# Patient Record
Sex: FEMALE | Race: WHITE
[De-identification: names, ages, dates, MRNs, and addresses within clinical notes are randomized per-mention and may not be internally consistent; named-entity substitution may affect disease eponyms.]

---

## 2019-12-28 ENCOUNTER — HOSPITAL ENCOUNTER (INPATIENT)
Dept: HOSPITAL 97 - ER | Age: 59
LOS: 3 days | Discharge: HOME | DRG: 871 | End: 2019-12-31
Attending: INTERNAL MEDICINE | Admitting: INTERNAL MEDICINE
Payer: COMMERCIAL

## 2019-12-28 VITALS — BODY MASS INDEX: 36.6 KG/M2

## 2019-12-28 DIAGNOSIS — A41.9: Primary | ICD-10-CM

## 2019-12-28 DIAGNOSIS — Z90.5: ICD-10-CM

## 2019-12-28 DIAGNOSIS — R65.21: ICD-10-CM

## 2019-12-28 DIAGNOSIS — E11.22: ICD-10-CM

## 2019-12-28 DIAGNOSIS — I12.9: ICD-10-CM

## 2019-12-28 DIAGNOSIS — K21.9: ICD-10-CM

## 2019-12-28 DIAGNOSIS — I48.91: ICD-10-CM

## 2019-12-28 DIAGNOSIS — J18.9: ICD-10-CM

## 2019-12-28 DIAGNOSIS — N18.3: ICD-10-CM

## 2019-12-28 DIAGNOSIS — E11.65: ICD-10-CM

## 2019-12-28 DIAGNOSIS — N17.0: ICD-10-CM

## 2019-12-28 LAB
ALBUMIN SERPL BCP-MCNC: 3.3 G/DL (ref 3.4–5)
ALP SERPL-CCNC: 87 U/L (ref 45–117)
ALT SERPL W P-5'-P-CCNC: 18 U/L (ref 12–78)
AST SERPL W P-5'-P-CCNC: 14 U/L (ref 15–37)
BUN BLD-MCNC: 33 MG/DL (ref 7–18)
GLUCOSE SERPLBLD-MCNC: 171 MG/DL (ref 74–106)
HCT VFR BLD CALC: 35.1 % (ref 36–45)
INR BLD: 1.04
LIPASE SERPL-CCNC: 108 U/L (ref 73–393)
LYMPHOCYTES # SPEC AUTO: 0.5 K/UL (ref 0.7–4.9)
MAGNESIUM SERPL-MCNC: 1.7 MG/DL (ref 1.8–2.4)
NT-PROBNP SERPL-MCNC: 239 PG/ML (ref ?–125)
PMV BLD: 9 FL (ref 7.6–11.3)
POTASSIUM SERPL-SCNC: 3.9 MMOL/L (ref 3.5–5.1)
RBC # BLD: 3.7 M/UL (ref 3.86–4.86)
TROPONIN (EMERG DEPT USE ONLY): < 0.02 NG/ML (ref 0–0.04)

## 2019-12-28 PROCEDURE — 74176 CT ABD & PELVIS W/O CONTRAST: CPT

## 2019-12-28 PROCEDURE — 84439 ASSAY OF FREE THYROXINE: CPT

## 2019-12-28 PROCEDURE — 71250 CT THORAX DX C-: CPT

## 2019-12-28 PROCEDURE — 84443 ASSAY THYROID STIM HORMONE: CPT

## 2019-12-28 PROCEDURE — 82728 ASSAY OF FERRITIN: CPT

## 2019-12-28 PROCEDURE — 71045 X-RAY EXAM CHEST 1 VIEW: CPT

## 2019-12-28 PROCEDURE — 81003 URINALYSIS AUTO W/O SCOPE: CPT

## 2019-12-28 PROCEDURE — 80053 COMPREHEN METABOLIC PANEL: CPT

## 2019-12-28 PROCEDURE — 82570 ASSAY OF URINE CREATININE: CPT

## 2019-12-28 PROCEDURE — 85025 COMPLETE CBC W/AUTO DIFF WBC: CPT

## 2019-12-28 PROCEDURE — 80202 ASSAY OF VANCOMYCIN: CPT

## 2019-12-28 PROCEDURE — 93005 ELECTROCARDIOGRAM TRACING: CPT

## 2019-12-28 PROCEDURE — 83880 ASSAY OF NATRIURETIC PEPTIDE: CPT

## 2019-12-28 PROCEDURE — 76700 US EXAM ABDOM COMPLETE: CPT

## 2019-12-28 PROCEDURE — 80048 BASIC METABOLIC PNL TOTAL CA: CPT

## 2019-12-28 PROCEDURE — 71046 X-RAY EXAM CHEST 2 VIEWS: CPT

## 2019-12-28 PROCEDURE — 84300 ASSAY OF URINE SODIUM: CPT

## 2019-12-28 PROCEDURE — 83605 ASSAY OF LACTIC ACID: CPT

## 2019-12-28 PROCEDURE — 84484 ASSAY OF TROPONIN QUANT: CPT

## 2019-12-28 PROCEDURE — 93306 TTE W/DOPPLER COMPLETE: CPT

## 2019-12-28 PROCEDURE — 84466 ASSAY OF TRANSFERRIN: CPT

## 2019-12-28 PROCEDURE — 36415 COLL VENOUS BLD VENIPUNCTURE: CPT

## 2019-12-28 PROCEDURE — 83690 ASSAY OF LIPASE: CPT

## 2019-12-28 PROCEDURE — 99285 EMERGENCY DEPT VISIT HI MDM: CPT

## 2019-12-28 PROCEDURE — 94640 AIRWAY INHALATION TREATMENT: CPT

## 2019-12-28 PROCEDURE — 82607 VITAMIN B-12: CPT

## 2019-12-28 PROCEDURE — 87040 BLOOD CULTURE FOR BACTERIA: CPT

## 2019-12-28 PROCEDURE — 83540 ASSAY OF IRON: CPT

## 2019-12-28 PROCEDURE — 83735 ASSAY OF MAGNESIUM: CPT

## 2019-12-28 PROCEDURE — 85610 PROTHROMBIN TIME: CPT

## 2019-12-28 PROCEDURE — 80076 HEPATIC FUNCTION PANEL: CPT

## 2019-12-28 PROCEDURE — 83036 HEMOGLOBIN GLYCOSYLATED A1C: CPT

## 2019-12-28 RX ADMIN — IPRATROPIUM BROMIDE SCH MG: 0.5 SOLUTION RESPIRATORY (INHALATION) at 20:00

## 2019-12-28 RX ADMIN — TAZOBACTAM SODIUM AND PIPERACILLIN SODIUM SCH MLS: 250; 2 INJECTION, SOLUTION INTRAVENOUS at 17:00

## 2019-12-28 RX ADMIN — ALBUTEROL SULFATE SCH: 2.5 SOLUTION RESPIRATORY (INHALATION) at 16:00

## 2019-12-28 RX ADMIN — Medication SCH: at 21:00

## 2019-12-28 RX ADMIN — SODIUM CHLORIDE SCH: 0.9 INJECTION, SOLUTION INTRAVENOUS at 23:00

## 2019-12-28 RX ADMIN — IPRATROPIUM BROMIDE SCH MG: 0.5 SOLUTION RESPIRATORY (INHALATION) at 14:15

## 2019-12-28 RX ADMIN — ALBUTEROL SULFATE SCH MG: 2.5 SOLUTION RESPIRATORY (INHALATION) at 20:00

## 2019-12-28 RX ADMIN — SODIUM CHLORIDE SCH MLS: 0.9 INJECTION, SOLUTION INTRAVENOUS at 13:00

## 2019-12-28 NOTE — ER
Nurse's Notes                                                                                     

 [facility]                                                                 

Name: Jeanne Jett                                                                                

Age: 59 yrs                                                                                       

Sex: Female                                                                                       

: 1960                                                                                   

MRN: [id number]                                                                                   

Arrival Date: 2019                                                                          

Time: 09:12                                                                                       

Account#: [id number]                                                                            

Bed 7                                                                                             

Private MD:                                                                                       

Diagnosis: Pneumonia due to other specified bacteria;Weakness;Hypotension;Sepsis, unspecified     

  organism;Other chest pain;Unspecified kidney failure;Hypomagnesemia                             

                                                                                                  

Presentation:                                                                                     

                                                                                             

09:20 Presenting complaint: Patient states: woke up this morning around 0700 with srini         sv  

      shoulder pain, left sided chest tightness that radiates to the left arm, SOB,               

      dizziness. Denies n/v. Transition of care: patient was not received from another            

      setting of care. Onset of symptoms was 2019 at 07:00. Risk Assessment: Do      

      you want to hurt yourself or someone else? Patient reports no desire to harm self or        

      others. Care prior to arrival: None.                                                        

09:20 Method Of Arrival: Wheelchair                                                           sv  

09:20 Acuity: PATRICIA 2                                                                           sv  

09:32 Initial Sepsis Screen: Does the patient meet any 2 criteria? HR > 90 bpm. No. Patient's sv  

      initial sepsis screen is negative. Does the patient have a suspected source of              

      infection? No. Patient's initial sepsis screen is negative.                                 

11:39 Initial Sepsis Screen: Does the patient meet any 2 criteria? Systolic BP < 90 mmHg.     sv  

      Mean Arterial Pressure (MAP) < 65. Yes Does the patient have a suspected source of          

      infection? Yes: Productive cough/pneumonia If YES to both, name of provider notified:       

      Antonio Flores MD                                                                           

                                                                                                  

Triage Assessment:                                                                                

09:20 General: Appears in no apparent distress. uncomfortable, well developed, Behavior is    sv  

      calm, cooperative, appropriate for age. Pain: Complains of pain in anterior aspect of       

      left upper chest, left breast, anterior aspect of right shoulder, anterior aspect of        

      left shoulder, left bicep, left antecubital area, dorsal aspect of left forearm, left       

      wrist and left hand Pain currently is 7 out of 10 on a pain scale. Neuro: Level of          

      Consciousness is awake, alert, obeys commands, Oriented to person, place, time,             

      situation, Gait is steady, Speech is normal. Cardiovascular: Patient's skin is warm and     

      dry. Respiratory: Reports shortness of breath on exertion Airway is patent Respiratory      

      effort is even, unlabored, Respiratory pattern is regular, symmetrical. GI: Patient         

      currently denies nausea, vomiting. Derm: Skin is intact, Skin is pink, warm \T\ dry.        

                                                                                                  

Historical:                                                                                       

- Allergies:                                                                                      

09: Erythromycin;                                                                           sv  

- Home Meds:                                                                                      

:22 Percocet Oral [Active]; Lyrica Oral [Active]; Prilosec Oral [Active];                   sv  

- PMHx:                                                                                           

09:22 Back pain;                                                                              sv  

- PSHx:                                                                                           

:22 right kidney removed;                                                                   sv  

                                                                                                  

- Immunization history:: Flu vaccine is up to date.                                               

- Social history:: Smoking status: Patient/guardian denies using tobacco.                         

- Ebola Screening: : No symptoms or risks identified at this time.                                

- Family history:: not pertinent.                                                                 

                                                                                                  

                                                                                                  

Screenin:30 Abuse screen: Denies threats or abuse. Denies injuries from another. Nutritional        sv  

      screening: No deficits noted. Tuberculosis screening: No symptoms or risk factors           

      identified. Fall Risk None identified.                                                      

                                                                                                  

Assessment:                                                                                       

09:40 Reassessment: Patient appears in no apparent distress at this time. No changes from     sv  

      previously documented assessment. Patient and/or family updated on plan of care and         

      expected duration. Pain level reassessed. Patient is alert, oriented x 3, equal             

      unlabored respirations, skin warm/dry/pink.                                                 

10:30 Reassessment: Patient appears in no apparent distress at this time. Patient and/or      sv  

      family updated on plan of care and expected duration. Pain level reassessed. Patient is     

      alert, oriented x 3, equal unlabored respirations, skin warm/dry/pink.                      

11:04 Reassessment: Patient appears in no apparent distress at this time. Patient and/or      sv  

      family updated on plan of care and expected duration. Pain level reassessed. General:       

      Appears in no apparent distress. comfortable, Behavior is cooperative, drowsy. Neuro:.      

      Respiratory: Respiratory effort is even, unlabored, Respiratory pattern is regular,         

      symmetrical.                                                                                

11:40 Reassessment: Code Sepsis called.                                                       sv  

12:19 Reassessment: Repeat lactate sent and called Janett in lab to inform her it was sent.   sv  

12:39 Reassessment: Dr Chacko at bedside.                                                   sv  

13:28 Reassessment: Patient appears in no apparent distress at this time. No changes from     sv  

      previously documented assessment. Patient and/or family updated on plan of care and         

      expected duration. Pain level reassessed. Patient is alert, oriented x 3, equal             

      unlabored respirations, skin warm/dry/pink.                                                 

14:41 Reassessment: Patient appears in no apparent distress at this time. No changes from     sv  

      previously documented assessment. Patient and/or family updated on plan of care and         

      expected duration. Pain level reassessed. Patient is alert, oriented x 3, equal             

      unlabored respirations, skin warm/dry/pink.                                                 

18:00 Reassessment: Attempted to call report.                                                 sv  

                                                                                                  

Vital Signs:                                                                                      

09:27 Pulse 112 MON; Resp 18; Temp 98.0; Pulse Ox 99% on R/A; Pain 7/10;                      sg  

09:33  / 94; Pulse 105; Resp 20; Temp 98.2; Pulse Ox 96% ; Weight 90.72 kg; Height 5    sv  

      ft. 2 in. (157.48 cm);                                                                      

09:48 Pulse 83; Resp 16; Pulse Ox 96% ;                                                       sv  

10:30 BP 71 / 46; Pulse 87; Resp 17; Pulse Ox 96% ;                                           sv  

11:03 BP 79 / 68; Pulse 90; Resp 16; Temp 98.0(O); Pulse Ox 100% on Nebulizer Mask;           mh5 

11:35 BP 80 / 55; Pulse 92; Resp 20; Pulse Ox 100% on 2 lpm NC;                               sv  

11:46 BP 89 / 55; Pulse 90; Resp 21; Pulse Ox 100% on 2 lpm NC;                               sv  

12:43 BP 90 / 64; Pulse 91; Resp 14; Temp 98.8(TE); Pulse Ox 100% on R/A;                     mh5 

13:49 BP 87 / 62; Pulse 81; Resp 16; Pulse Ox 97% on 2 lpm NC;                                sv  

14:00 BP 85 / 53; Pulse 86; Resp 16; Pulse Ox 99% on R/A;                                     sv  

15:29 BP 98 / 61; Pulse 87; Resp 17; Temp 98.2; Pulse Ox 99% on R/A;                          sg  

15:50 BP 91 / 58; Pulse 82; Resp 17; Temp 98.2; Pulse Ox 100% on R/A;                         sg  

09:33 Body Mass Index 36.58 (90.72 kg, 157.48 cm)                                             sv  

10:30 Dr Flores informed of vitals, medication orders received, see MAR.                    sv  

11:35 MAP-62                                                                                  sv  

                                                                                                  

ED Course:                                                                                        

09:12 Patient arrived in ED.                                                                  ss  

09:21 Triage completed.                                                                       sv  

09:21 Antonio Flores MD is Attending Physician.                                             kali 

09:22 Arm band placed on.                                                                     sv  

09:22 Patient has correct armband on for positive identification. Placed in gown. Bed in low  sv  

      position. Call light in reach. Adult w/ patient. Cardiac monitor on. Pulse ox on. NIBP      

      on. Door closed. Head of bed elevated.                                                      

09:30 Lorna Shaver, RN is Primary Nurse.                                                  sv  

09:33 Patient maintains SpO2 saturation greater than 95% on room air.                         sv  

09:40 Awaiting ED provider evaluation.                                                        sv  

09:48 XRAY Chest (1 view) Sent.                                                               sv  

09:48 X-ray(s) taken.                                                                         sv  

09:49 XRAY Chest (1 view) In Process Unspecified.                                             EDMS

09:54 ED physician to see patient.                                                            sv  

10:15 Radiology exam delayed due to lab results not completed at this time. (BUN/Creatinine). bq  

10:20 Basic Metabolic Panel Sent.                                                             mh5 

10:20 CBC with Diff Sent.                                                                     mh5 

10:20 LFT's Sent.                                                                             mh5 

10:20 Magnesium Sent.                                                                         mh5 

10:20 NT PRO-BNP Sent.                                                                        mh5 

10:20 PT-INR Sent.                                                                            mh5 

10:20 Troponin (emerg Dept Use Only) Sent.                                                    mh5 

10:21 Initial lab(s) drawn, by me, sent to lab. EKG done, by ED staff, reviewed by Antonio Flores MD. Inserted saline lock: 20 gauge in right antecubital area, using aseptic        

      technique. Blood collected.                                                                 

10:25 Inserted saline lock: 20 gauge in right wrist, using aseptic technique. Flushed right   sv  

      with 5 ml normal saline.                                                                    

10:30 First set of blood cultures drawn by me. Inserted saline lock: 20 gauge in left wrist,  sv  

      using aseptic technique. Blood collected. Flushed left with 5 ml normal saline.             

10:45 Second set of blood cultures drawn by me.                                               sv  

10:59 Neha Chacko MD is Hospitalizing Provider.                                          kali 

11:17 Patient moved to CT via stretcher.                                                      sv  

11:23 CT completed. Patient tolerated procedure well. Patient moved back from CT.             nj  

11:23 CT Chest Abdomen Pelvis W/O Contrast: no iv no oral In Process Unspecified.             EDMS

11:42 Awaiting bed assignment, Awaiting radiology results.                                    sv  

11:51 Lactate Sent.                                                                           mh5 

11:51 Repeat lab(s) drawn.                                                                    5 

17:26 No provider procedures requiring assistance completed. Patient admitted, IV remains in  sv  

      place. intact.                                                                              

                                                                                                  

Administered Medications:                                                                         

      Discontinued: Heparin (MI Drip) 12 units/kg/hr - (HEParin 75880 units, D5W 500 ml) IV       

      at calculated rate Per protocol; Max initial rate 1000 units/hr                             

10:14 Drug: Aspirin Chewable Tablet 324 mg Route: PO;                                         sv  

11:02 Follow up: Response: No adverse reaction                                                sv  

10:14 Drug: Lopressor 25 mg Route: PO;                                                        sv  

11:01 Follow up: Response: No adverse reaction                                                sv  

10:14 Drug: ProTONIX 40 mg Route: IVP; Site: right antecubital;                               sv  

11:01 Follow up: Response: No adverse reaction                                                sv  

10:20 Drug: Zofran 4 mg Route: IVP; Site: right antecubital;                                  sv  

11:00 Follow up: Response: No adverse reaction                                                sv  

10:22 Drug: morphine 2 mg {Note: rass0.} Route: IVP; Site: right antecubital;                 sv  

11:00 Follow up: Response: No adverse reaction; Pain is decreased; RASS: Light sedation (-2)  sv  

10:23 Drug: Heparin (MI Drip) 12 units/kg/hr - (HEParin 03700 units, D5W 500 ml)              sv  

      {Co-Signature: sg (Víctor Boyer RN).} Route: IV; Rate: calculated rate; Site: right           

      antecubital;                                                                                

10:23 Drug: Heparin (MI-Bolus No thrombolytic) - HEParin 60 units/kg {Co-Signature: sg        sv  

      (Víctor Boyer RN).} Route: IVP; Site: right antecubital;                                      

11:01 Follow up: Response: No adverse reaction                                                sv  

10:55 Drug: Rocephin 2 grams Route: IV; Rate: per protocol; Site: left wrist;                 sv  

10:59 Follow up: Response: No adverse reaction; IV Status: Completed infusion; IV Intake: 20mlsv  

10:59 Drug: Xopenex 1.25 mg Route: Inhalation;                                                sv  

10:59 Drug: AtroVENT Aerosol 0.5 mg Route: Inhalation;                                        sv  

11:00 Drug: NS 0.9% 1000 ml Route: IV; Rate: 1 bolus; Site: left wrist;                       sv  

12:20 Follow up: Response: No adverse reaction; IV Status: Completed infusion; IV Intake:     sv  

      1000ml                                                                                      

11:00 Drug: LevaQUIN 750 mg Volume: 150 ml; Route: IVPB; Infused Over: 90 mins; Site: left    sv  

      wrist;                                                                                      

12:39 Follow up: Response: No adverse reaction; IV Status: Completed infusion; IV Intake:     sv  

      150ml                                                                                       

11:00 Drug: NS 0.9% 1000 ml Route: IV; Rate: 1 bolus; Site: left wrist;                       sv  

12:38 Follow up: Response: No adverse reaction; IV Status: Completed infusion; IV Intake:     sv  

      1000ml                                                                                      

11:12 Drug: Magnesium Sulfate 1 grams Route: IVPB; Infused Over: 1 hrs; Site: right wrist;    sv  

12:20 Follow up: Response: No adverse reaction; IV Status: Completed infusion; IV Intake:     sv  

      100ml                                                                                       

12:19 Drug: NS 0.9% 1000 ml Route: IV; Rate: 1 bolus; Site: left forearm;                     sv  

13:30 Follow up: Response: No adverse reaction; IV Status: Completed infusion; IV Intake:     sv  

      1000ml                                                                                      

13:28 Drug: NS 0.9% 2000 ml Route: IV; Rate: 1 bolus; Site: left wrist;                       sg  

15:00 Follow up: Response: No adverse reaction; IV Status: Completed infusion; IV Intake:     sv  

      2000ml                                                                                      

14:41 Drug: NS 0.9% 1000 ml Route: IV; Rate: 125 ml/hr; Site: left wrist;                     sv  

18:49 Follow up: Response: No adverse reaction; IV Status: Infusion continued upon admission  sv  

                                                                                                  

                                                                                                  

Intake:                                                                                           

10:59 IV: 20ml; Total: 20ml.                                                                  sv  

12:20 IV: 1000ml; Total: 1020ml.                                                              sv  

12:20 IV: 100ml; Total: 1120ml.                                                               sv  

12:38 IV: 1000ml; Total: 2120ml.                                                              sv  

12:39 IV: 150ml; Total: 2270ml.                                                               sv  

13:30 IV: 1000ml; Total: 3270ml.                                                              sv  

15:00 IV: 2000ml; Total: 5270ml.                                                              sv  

                                                                                                  

Outcome:                                                                                          

11:01 Decision to Hospitalize by Provider.                                                    kali 

17:26 Admitted to ER Hold.  Please see [facility] for further documentation.                    sv  

17:26 Condition: stable                                                                           

17:26 Instructed on the need for admit.                                                           

18:49 Patient left the ED.                                                                    sv  

                                                                                                  

Signatures:                                                                                       

Dispatcher [facility]                           Lorna Benton RN RN sv Gay, Steven, RN                         RN   sg                                                   

Antonio Flores MD MD cha Quilty, Betty                                                                                   

Sarah Millan RN RN                                                      

[place] [id number]                                                  

Víctor Boyer RN                                                                                   

                                                                                                  

Corrections: (The following items were deleted from the chart)                                    

09:33 09:20 Acuity: PATRICIA 3 sv                                                                  sv  

09:34 09:33 Resp 20bpm; Pulse Ox 96%; Temp 98.2F; 90.72 kg; Height 5 ft. 2 in.; BMI: 36.5; sv sv  

09:40 09:33 Pulse 105bpm; Resp 20bpm; Pulse Ox 96%; Temp 98.2F; 90.72 kg; Height 5 ft. 2 in.; sv  

      BMI: 36.5; sv                                                                               

11:11 11:03 BP 79 / 68; Pulse 90bpm; Resp 16bpm; Pulse Ox 100% Nebulizer Mask; [facility] 

                                                                                                  

**************************************************************************************************

## 2019-12-28 NOTE — RAD REPORT
EXAM DESCRIPTION:  CT - Chest Abd Pelvis Wo Con - 12/28/2019 11:23 am

 

CLINICAL HISTORY:  Cough;Abdominal distention, bilateral shoulder and upper chest pain, chest tightne
ss radiating to the left arm, shortness of breath

 

COMPARISON:  None.

 

TECHNIQUE:  During dynamic enhancement using 100 milliliters nonionic IV contrast, axial 5 millimeter
 thick images of the chest, abdomen and pelvis were obtained. Biphasic technique was utilized through
 the abdomen.  Oral contrast was administered.

 

All CT scans are performed using dose optimization technique as appropriate and may include automated
 exposure control or mA/KV adjustment according to patient size.

 

FINDINGS:  Moderate-sized area airspace opacification is present in the posterior inferior right uppe
r lobe near the minor fissure. Air bronchograms are present. No cavitation. Patchy interstitial and a
lveolar opacities are present in the posterior gutter on the right. Right middle lobe and left lung f
ield are spared. No pneumothorax or pleural effusion.  No chest wall mass or abnormal axillary lympha
denopathy seen.  Mediastinal and hilar regions show no mass or lymphadenopathy.  No significant cardi
ac finding.

 

The liver, spleen and pancreas show no significant findings.  Gallbladder and biliary tree are normal
.

 

Left kidney is absent. No hydronephrosis of the right kidney. No mass identified. Isodense masses and
 pyelonephritis are not excluded on a noncontrast examination. No adrenal abnormalities.  Urinary idris
dder is contracted. Uterus is normal size for age. There is substantial limitation of mid and pelvic 
floor assessment due to bilateral hip prosthesis spray artifact.

 

No dilated bowel loops or focal ball bowel wall thickening.  No free air, free fluid or inflammatory 
stranding.  No mass or bulky lymphadenopathy. A 2 centimeter fat only umbilical hernia is present. An
 adjacent 3 cm supraumbilical hernia seen on the right with a 2 centimeter neck. An additional 5 cent
imeter diameter fat only hernias present to the right of midline above the umbilicus. This has a 2 ce
ntimeter neck.

 

Postsurgical changes are present with fusion hardware in place L3-S1. Advanced degenerative changes a
re present involving T11- L2. Advanced degenerative disc disease is present. There is a retrolisthesi
s of L2 on L3. Acute component doubtful. T10 hemangiomas present.

 

IMPRESSION:  Moderate-sized right upper lobe pneumonia with smaller right lower lobe pneumonia. No ca
vitation or complicating finding.

 

No other significant chest finding.

 

No acute abdominal or pelvic finding. Left kidney is absent.

 

Umbilical and supraumbilical fat only ventral hernias seen.

 

Postsurgical fusion changes at L3-S1 with extensive advanced for age degenerative disc and bone dee
es T11-L2.

## 2019-12-28 NOTE — EDPHYS
Physician Documentation                                                                           

 [facility]                                                                 

Name: Jeanne Jett                                                                                

Age: 59 yrs                                                                                       

Sex: Female                                                                                       

: 1960                                                                                   

MRN: [id number]                                                                                   

Arrival Date: 2019                                                                          

Time: 09:12                                                                                       

Account#: [id number]                                                                            

Bed 7                                                                                             

Private MD:                                                                                       

ED Physician Antonio Flores                                                                      

HPI:                                                                                              

                                                                                             

09:58 This 59 yrs old  Female presents to ER via Wheelchair with complaints of Chest kali 

      Pain.                                                                                       

09:58 The patient or guardian reports chest pain that is located primarily in the substernal  kali 

      area, anterior chest wall. Onset: last night. The pain radiates to the left arm.            

      Associated signs and symptoms: The patient has no apparent associated signs or              

      symptoms. The chest pain is described as a heaviness, squeezing. Modifying factors: The     

      symptoms are alleviated by nothing. the symptoms are aggravated by nothing. The patient     

      has not experienced similar symptoms in the past.                                           

                                                                                                  

Historical:                                                                                       

- Allergies:                                                                                      

09:22 Erythromycin;                                                                           sv  

- Home Meds:                                                                                      

09:22 Percocet Oral [Active]; Lyrica Oral [Active]; Prilosec Oral [Active];                   sv  

- PMHx:                                                                                           

09:22 Back pain;                                                                              sv  

- PSHx:                                                                                           

09:22 right kidney removed;                                                                   sv  

                                                                                                  

- Immunization history:: Flu vaccine is up to date.                                               

- Social history:: Smoking status: Patient/guardian denies using tobacco.                         

- Ebola Screening: : No symptoms or risks identified at this time.                                

- Family history:: not pertinent.                                                                 

                                                                                                  

                                                                                                  

ROS:                                                                                              

09:58 Constitutional: Negative for fever, chills, and weight loss, Eyes: Negative for injury, kali 

      pain, redness, and discharge, ENT: Negative for injury, pain, and discharge, Neck:          

      Negative for injury, pain, and swelling, Respiratory: Negative for shortness of breath,     

      cough, wheezing, and pleuritic chest pain, Abdomen/GI: Negative for abdominal pain,         

      nausea, vomiting, diarrhea, and constipation, Back: Negative for injury and pain, :       

      Negative for injury, bleeding, discharge, and swelling, MS/Extremity: Negative for          

      injury and deformity, Neuro: Negative for headache, weakness, numbness, tingling, and       

      seizure, Psych: Negative for depression, anxiety, suicide ideation, homicidal ideation,     

      and hallucinations, Allergy/Immunology: Negative for hives, rash, and allergies,            

      Endocrine: Negative for neck swelling, polydipsia, polyuria, polyphagia, and marked         

      weight changes, Hematologic/Lymphatic: Negative for swollen nodes, abnormal bleeding,       

      and unusual bruising.                                                                       

09:58 Cardiovascular: Positive for chest pain, of the left arm and chest.                         

                                                                                                  

Exam:                                                                                             

09:58 Constitutional:  This is a well developed, well nourished patient who is awake, alert,  kali 

      and in no acute distress. Head/Face:  Normocephalic, atraumatic. Eyes:  Pupils equal        

      round and reactive to light, extra-ocular motions intact.  Lids and lashes normal.          

      Conjunctiva and sclera are non-icteric and not injected.  Cornea within normal limits.      

      Periorbital areas with no swelling, redness, or edema. ENT:  Nares patent. No nasal         

      discharge, no septal abnormalities noted.  Tympanic membranes are normal and external       

      auditory canals are clear.  Oropharynx with no redness, swelling, or masses, exudates,      

      or evidence of obstruction, uvula midline.  Mucous membranes moist. Neck:  Trachea          

      midline, no thyromegaly or masses palpated, and no cervical lymphadenopathy.  Supple,       

      full range of motion without nuchal rigidity, or vertebral point tenderness.  No            

      Meningismus. Chest/axilla:  Normal chest wall appearance and motion.  Nontender with no     

      deformity.  No lesions are appreciated. Cardiovascular:  Regular rate and rhythm with a     

      normal S1 and S2.  No gallops, murmurs, or rubs.  Normal PMI, no JVD.  No pulse             

      deficits. Respiratory:  Lungs have equal breath sounds bilaterally, clear to                

      auscultation and percussion.  No rales, rhonchi or wheezes noted.  No increased work of     

      breathing, no retractions or nasal flaring. Abdomen/GI:  Soft, non-tender, with normal      

      bowel sounds.  No distension or tympany.  No guarding or rebound.  No evidence of           

      tenderness throughout. Back:  No spinal tenderness.  No costovertebral tenderness.          

      Full range of motion. MS/ Extremity:  Pulses equal, no cyanosis.  Neurovascular intact.     

       Full, normal range of motion. Neuro:  Awake and alert, GCS 15, oriented to person,         

      place, time, and situation.  Cranial nerves II-XII grossly intact.  Motor strength 5/5      

      in all extremities.  Sensory grossly intact.  Cerebellar exam normal.  Normal gait.         

      Psych:  Awake, alert, with orientation to person, place and time.  Behavior, mood, and      

      affect are within normal limits.                                                            

09:58 Skin: Appearance: Color: pale, Temperature: normal temperature, Moisture: diaphoretic,      

      petechiae, not noted, ecchymosis, not noted, flushing, not noted, diaphoresis is noted,     

      swelling, is not appreciated.                                                               

10:01 Musculoskeletal/extremity: ROM: intact in all extremities, full active range of motion, kali 

      full passive range of motion, Circulation is intact in all extremities. Sensation           

      intact. Compartment Syndrome exam of affected extremity: is normal. DVT Exam: No signs      

      of deep vein thrombosis. no pain, no swelling, no tenderness, negative Homans' sign         

      noted on exam, no appreciated bluish discoloration, no erythema, no increased warmth.       

                                                                                                  

Vital Signs:                                                                                      

09:27 Pulse 112 MON; Resp 18; Temp 98.0; Pulse Ox 99% on R/A; Pain 7/10;                      sg  

09:33  / 94; Pulse 105; Resp 20; Temp 98.2; Pulse Ox 96% ; Weight 90.72 kg; Height 5    sv  

      ft. 2 in. (157.48 cm);                                                                      

09:48 Pulse 83; Resp 16; Pulse Ox 96% ;                                                       sv  

10:30 BP 71 / 46; Pulse 87; Resp 17; Pulse Ox 96% ;                                           sv  

11:03 BP 79 / 68; Pulse 90; Resp 16; Temp 98.0(O); Pulse Ox 100% on Nebulizer Mask;           mh5 

11:35 BP 80 / 55; Pulse 92; Resp 20; Pulse Ox 100% on 2 lpm NC;                               sv  

11:46 BP 89 / 55; Pulse 90; Resp 21; Pulse Ox 100% on 2 lpm NC;                               sv  

12:43 BP 90 / 64; Pulse 91; Resp 14; Temp 98.8(TE); Pulse Ox 100% on R/A;                     mh5 

13:49 BP 87 / 62; Pulse 81; Resp 16; Pulse Ox 97% on 2 lpm NC;                                sv  

14:00 BP 85 / 53; Pulse 86; Resp 16; Pulse Ox 99% on R/A;                                     sv  

15:29 BP 98 / 61; Pulse 87; Resp 17; Temp 98.2; Pulse Ox 99% on R/A;                          sg  

15:50 BP 91 / 58; Pulse 82; Resp 17; Temp 98.2; Pulse Ox 100% on R/A;                         sg  

09:33 Body Mass Index 36.58 (90.72 kg, 157.48 cm)                                             sv  

10:30 Dr Flores informed of vitals, medication orders received, see MAR.                    sv  

11:35 MAP-62                                                                                  sv  

                                                                                                  

MDM:                                                                                              

09:21 Patient medically screened.                                                             [facility] 

10:01 Data reviewed: vital signs, nurses notes, lab test result(s), EKG, radiologic studies,  [facility] 

      CT scan, plain films.                                                                       

                                                                                                  

                                                                                             

09:32 Order name: Basic Metabolic Panel; Complete Time: 11:03                                   

                                                                                             

09:32 Order name: CBC with Diff; Complete Time: 10:32                                           

                                                                                             

09:32 Order name: LFT's; Complete Time: 11:03                                                   

                                                                                             

09:32 Order name: Magnesium; Complete Time: 11:03                                               

                                                                                             

09:32 Order name: NT PRO-BNP; Complete Time: 11:03                                              

                                                                                             

09:32 Order name: PT-INR; Complete Time: 10:32                                                  

                                                                                             

09:32 Order name: Troponin (emerg Dept Use Only); Complete Time: 11:03                          

                                                                                             

09:34 Order name: Lipase; Complete Time: 11:03                                                [facility] 

                                                                                             

10:26 Order name: Blood Culture Adult (2)                                                     [facility] 

                                                                                             

11:39 Order name: Lactate                                                                     sv  

                                                                                             

12:25 Order name: CBC with Automated Diff                                                     [facility]

                                                                                             

12:25 Order name: CBC with Automated Diff                                                     [facility]

                                                                                             

12:25 Order name: Comprehensive Metabolic Panel                                               [facility]

                                                                                             

12:25 Order name: Comprehensive Metabolic Panel                                               [facility]

                                                                                             

09:32 Order name: XRAY Chest (1 view); Complete Time: 10:24                                     

                                                                                             

11:02 Order name: CT Chest Abdomen Pelvis W/O Contrast: no iv no oral                         [facility] 

                                                                                             

12:25 Order name: Troponin I                                                                  [facility]

                                                                                             

12:25 Order name: Troponin I                                                                  [facility]

                                                                                             

12:25 Order name: Troponin I                                                                  [facility]

                                                                                             

12:25 Order name: Troponin I                                                                  [facility]

                                                                                             

12:25 Order name: Vancomycin Level Trough                                                     [facility]

                                                                                             

12:25 Order name: Vancomycin Level Trough                                                     [facility]

                                                                                             

13:28 Order name: Urine Dipstick--Ancillary (enter results)                                   eb  

                                                                                             

13:48 Order name: Urine Creatinine                                                            sg  

                                                                                             

13:48 Order name: Urine Sodium Random                                                         sg  

                                                                                             

14:12 Order name: Urine Dipstick-Ancillary                                                    [facility]

                                                                                             

14:15 Order name: UR CREAT                                                                    [facility]

                                                                                             

14:17 Order name: UR SODIUM                                                                   [facility]

                                                                                             

09:32 Order name: EKG; Complete Time: 09:33                                                     

                                                                                             

09:32 Order name: Cardiac monitoring; Complete Time: 09:39                                      

                                                                                             

09:32 Order name: EKG - Nurse/Tech; Complete Time: 09:39                                        

                                                                                             

09:32 Order name: IV Saline Lock; Complete Time: 09:48                                          

                                                                                             

09:32 Order name: Labs collected and sent; Complete Time: 09:48                                 

                                                                                             

09:32 Order name: O2 Per Protocol; Complete Time: 09:40                                         

                                                                                             

09:32 Order name: O2 Sat Monitoring; Complete Time: 09:40                                       

                                                                                             

09:53 Order name: EKG; Complete Time: 09:54                                                     

                                                                                             

09:53 Order name: EKG - Nurse/Tech; Complete Time: 10:14                                        

                                                                                             

09:58 Order name: EKG - Nurse/Tech; Complete Time: 10:19                                      [facility] 

                                                                                             

09:58 Order name: EKG; Complete Time: 09:58                                                   [facility] 

                                                                                             

10:07 Order name: Labs - recollect needed: all labs to be recollected; Complete Time: 10:19   eb  

                                                                                             

12:25 Order name: CONS Pharmacy Consult                                                       [facility]

                                                                                             

12:25 Order name: Clear Liquid                                                                [facility]

                                                                                             

15:28 Order name: Diet Clear Liquid; Complete Time: 15:29                                       

                                                                                             

10:45 Order name: IV Saline Lock - Large Bore; Complete Time: 10:59                           [facility] 

                                                                                             

12:09 Order name: Labs - recollect needed: recollect lac; Complete Time: 12:19                eb  

                                                                                                  

Administered Medications:                                                                         

      Discontinued: Heparin (MI Drip) 12 units/kg/hr - (HEParin 60709 units, D5W 500 ml) IV       

      at calculated rate Per protocol; Max initial rate 1000 units/hr                             

10:14 Drug: Aspirin Chewable Tablet 324 mg Route: PO;                                         sv  

11:02 Follow up: Response: No adverse reaction                                                sv  

10:14 Drug: Lopressor 25 mg Route: PO;                                                        sv  

11:01 Follow up: Response: No adverse reaction                                                sv  

10:14 Drug: ProTONIX 40 mg Route: IVP; Site: right antecubital;                               sv  

11:01 Follow up: Response: No adverse reaction                                                sv  

10:20 Drug: Zofran 4 mg Route: IVP; Site: right antecubital;                                  sv  

11:00 Follow up: Response: No adverse reaction                                                sv  

10:22 Drug: morphine 2 mg {Note: rass0.} Route: IVP; Site: right antecubital;                 sv  

11:00 Follow up: Response: No adverse reaction; Pain is decreased; RASS: Light sedation (-2)  sv  

10:23 Drug: Heparin (MI Drip) 12 units/kg/hr - (HEParin 36287 units, D5W 500 ml)              sv  

      {Co-Signature: sg (Víctor Boyer RN).} Route: IV; Rate: calculated rate; Site: right           

      antecubital;                                                                                

10:23 Drug: Heparin (MI-Bolus No thrombolytic) - HEParin 60 units/kg {Co-Signature: sg        sv  

      (Víctor Boyer RN).} Route: IVP; Site: right antecubital;                                      

11:01 Follow up: Response: No adverse reaction                                                sv  

10:55 Drug: Rocephin 2 grams Route: IV; Rate: per protocol; Site: left wrist;                 sv  

10:59 Follow up: Response: No adverse reaction; IV Status: Completed infusion; IV Intake: 20mlsv  

10:59 Drug: Xopenex 1.25 mg Route: Inhalation;                                                sv  

10:59 Drug: AtroVENT Aerosol 0.5 mg Route: Inhalation;                                        sv  

11:00 Drug: NS 0.9% 1000 ml Route: IV; Rate: 1 bolus; Site: left wrist;                       sv  

12:20 Follow up: Response: No adverse reaction; IV Status: Completed infusion; IV Intake:     sv  

      1000ml                                                                                      

11:00 Drug: LevaQUIN 750 mg Volume: 150 ml; Route: IVPB; Infused Over: 90 mins; Site: left    sv  

      wrist;                                                                                      

12:39 Follow up: Response: No adverse reaction; IV Status: Completed infusion; IV Intake:     sv  

      150ml                                                                                       

11:00 Drug: NS 0.9% 1000 ml Route: IV; Rate: 1 bolus; Site: left wrist;                       sv  

12:38 Follow up: Response: No adverse reaction; IV Status: Completed infusion; IV Intake:     sv  

      1000ml                                                                                      

11:12 Drug: Magnesium Sulfate 1 grams Route: IVPB; Infused Over: 1 hrs; Site: right wrist;    sv  

12:20 Follow up: Response: No adverse reaction; IV Status: Completed infusion; IV Intake:     sv  

      100ml                                                                                       

12:19 Drug: NS 0.9% 1000 ml Route: IV; Rate: 1 bolus; Site: left forearm;                     sv  

13:30 Follow up: Response: No adverse reaction; IV Status: Completed infusion; IV Intake:     sv  

      1000ml                                                                                      

13:28 Drug: NS 0.9% 2000 ml Route: IV; Rate: 1 bolus; Site: left wrist;                       sg  

15:00 Follow up: Response: No adverse reaction; IV Status: Completed infusion; IV Intake:     sv  

      2000ml                                                                                      

14:41 Drug: NS 0.9% 1000 ml Route: IV; Rate: 125 ml/hr; Site: left wrist;                     sv  

18:49 Follow up: Response: No adverse reaction; IV Status: Infusion continued upon admission  sv  

                                                                                                  

                                                                                                  

Disposition:                                                                                      

19 11:01 Hospitalization ordered by Neha Chacko for Inpatient Admission. Preliminary    

  diagnosis are Pneumonia due to other specified bacteria, Weakness,                              

  Hypotension, Sepsis, unspecified organism, Other chest pain, Unspecified                        

  kidney failure, Hypomagnesemia.                                                                 

- Bed requested for Telemetry/MedSurg (Inpatient).                                                

- Status is Inpatient Admission.                                                              sv  

- Condition is Fair.                                                                              

- Problem is new.                                                                                 

- Symptoms have improved.                                                                         

UTI on Admission? No                                                                              

                                                                                                  

                                                                                                  

                                                                                                  

Signatures:                                                                                       

Dispatcher [employer]                           Lorna Benton RN RN sv Gay, Steven, RN RN sg Anderson, Corey, MD MD cha Smirch, Shelby, RN RN ss Botello, Elizabeth eb Steven Gay RN                                                                                   

                                                                                                  

Corrections: (The following items were deleted from the chart)                                    

11:04 11:01 Hospitalization Ordered by Neha Chacko MD for Inpatient Admission. Preliminary [facility] 

      diagnosis is Pneumonia due to other specified bacteria; Weakness; Hypotension; Sepsis,      

      unspecified organism; Other chest pain; Unspecified kidney failure. Bed requested for       

      Telemetry/MedSurg (Inpatient). Status is Inpatient Admission. Condition is Fair.            

      Problem is new. Symptoms have improved. UTI on Admission? No. kali                           

11:09 09:58 Angio Aorta For Dissection+CT.RAD.BRZ ordered. EDMS                               EDMS

11:10 10:58 Thorax Wo Con+CT.RAD.BRZ ordered. EDMS                                            EDMS

11:15 11:04 2019 11:01 Hospitalization Ordered by Neha Chacko MD for Inpatient       eb  

      Admission. Preliminary diagnosis is Pneumonia due to other specified bacteria;              

      Weakness; Hypotension; Sepsis, unspecified organism; Other chest pain; Unspecified          

      kidney failure; Hypomagnesemia. Bed requested for Telemetry/MedSurg (Inpatient). Status     

      is Inpatient Admission. Condition is Fair. Problem is new. Symptoms have improved. UTI      

      on Admission? No. kali                                                                       

14:13 11:15 2019 11:01 Hospitalization Ordered by Neha Chacko MD for Inpatient       eb  

      Admission. Preliminary diagnosis is Pneumonia due to other specified bacteria;              

      Weakness; Hypotension; Sepsis, unspecified organism; Other chest pain; Unspecified          

      kidney failure; Hypomagnesemia. Bed requested for Telemetry/MedSurg (Inpatient). Status     

      is Inpatient Admission. Condition is Fair. Problem is new. Symptoms have improved. UTI      

      on Admission? No. eb                                                                        

17:52 14:13 2019 11:01 Hospitalization Ordered by Neha Chacko MD for Inpatient       eb  

      Admission. Preliminary diagnosis is Pneumonia due to other specified bacteria;              

      Weakness; Hypotension; Sepsis, unspecified organism; Other chest pain; Unspecified          

      kidney failure; Hypomagnesemia. Bed requested for [facility] ER HOLD. Status is Inpatient         

      Admission. Condition is Fair. Problem is new. Symptoms have improved. UTI on Admission?     

      No. eb                                                                                      

18:49 17:52 2019 11:01 Hospitalization Ordered by Neha Chacko MD for Inpatient       sv  

      Admission. Preliminary diagnosis is Pneumonia due to other specified bacteria;              

      Weakness; Hypotension; Sepsis, unspecified organism; Other chest pain; Unspecified          

      kidney failure; Hypomagnesemia. Bed requested for Telemetry/MedSurg (Inpatient). Status     

      is Inpatient Admission. Condition is Fair. Problem is new. Symptoms have improved. UTI      

      on Admission? No. eb                                                                        

                                                                                                  

**************************************************************************************************

## 2019-12-28 NOTE — RAD REPORT
EXAM DESCRIPTION:  RAD - Chest Single View - 12/28/2019 9:50 am

 

CLINICAL HISTORY:  Bilateral shoulder pain, left-sided chest pain, shortness of breath

 

COMPARISON:  None

 

TECHNIQUE:  AP portable chest image was obtained 0948 hours .

 

FINDINGS:  Lung volumes are low. Patchy airspace opacification is present in the mid right lung field
 most likely right upper lobe pneumonia. Bilateral lower lung field atelectasis changes are present. 
Failure and volume overload are not suspected.

 

 Heart and vasculature are normal. No measurable pleural effusion and no pneumothorax. No acute bony 
abnormality seen. No acute aortic findings suspected.

 

IMPRESSION:  Right upper lobe pneumonia.

## 2019-12-29 LAB
ALBUMIN SERPL BCP-MCNC: 2.5 G/DL (ref 3.4–5)
ALP SERPL-CCNC: 64 U/L (ref 45–117)
ALT SERPL W P-5'-P-CCNC: 13 U/L (ref 12–78)
AST SERPL W P-5'-P-CCNC: 9 U/L (ref 15–37)
BUN BLD-MCNC: 24 MG/DL (ref 7–18)
GLUCOSE SERPLBLD-MCNC: 85 MG/DL (ref 74–106)
HCT VFR BLD CALC: 29 % (ref 36–45)
LYMPHOCYTES # SPEC AUTO: 2.7 K/UL (ref 0.7–4.9)
PMV BLD: 9.6 FL (ref 7.6–11.3)
POTASSIUM SERPL-SCNC: 4.1 MMOL/L (ref 3.5–5.1)
RBC # BLD: 3.04 M/UL (ref 3.86–4.86)
TROPONIN I: < 0.02 NG/ML (ref 0–0.04)

## 2019-12-29 RX ADMIN — TAZOBACTAM SODIUM AND PIPERACILLIN SODIUM SCH MLS: 250; 2 INJECTION, SOLUTION INTRAVENOUS at 01:29

## 2019-12-29 RX ADMIN — ALBUTEROL SULFATE SCH MG: 2.5 SOLUTION RESPIRATORY (INHALATION) at 07:40

## 2019-12-29 RX ADMIN — SODIUM CHLORIDE SCH: 0.45 INJECTION, SOLUTION INTRAVENOUS at 22:00

## 2019-12-29 RX ADMIN — TAZOBACTAM SODIUM AND PIPERACILLIN SODIUM SCH MLS: 250; 2 INJECTION, SOLUTION INTRAVENOUS at 12:33

## 2019-12-29 RX ADMIN — SODIUM CHLORIDE SCH MLS: 0.45 INJECTION, SOLUTION INTRAVENOUS at 22:00

## 2019-12-29 RX ADMIN — SODIUM CHLORIDE SCH MLS: 0.9 INJECTION, SOLUTION INTRAVENOUS at 08:36

## 2019-12-29 RX ADMIN — TAZOBACTAM SODIUM AND PIPERACILLIN SODIUM SCH MLS: 250; 2 INJECTION, SOLUTION INTRAVENOUS at 17:38

## 2019-12-29 RX ADMIN — IPRATROPIUM BROMIDE SCH: 0.5 SOLUTION RESPIRATORY (INHALATION) at 14:00

## 2019-12-29 RX ADMIN — Medication SCH ML: at 12:37

## 2019-12-29 RX ADMIN — IPRATROPIUM BROMIDE SCH MG: 0.5 SOLUTION RESPIRATORY (INHALATION) at 01:20

## 2019-12-29 RX ADMIN — ALBUTEROL SULFATE SCH: 2.5 SOLUTION RESPIRATORY (INHALATION) at 00:00

## 2019-12-29 RX ADMIN — SODIUM CHLORIDE SCH: 0.9 INJECTION, SOLUTION INTRAVENOUS at 19:00

## 2019-12-29 RX ADMIN — IPRATROPIUM BROMIDE SCH MG: 0.5 SOLUTION RESPIRATORY (INHALATION) at 07:40

## 2019-12-29 RX ADMIN — FAMOTIDINE SCH MG: 20 TABLET, FILM COATED ORAL at 12:37

## 2019-12-29 RX ADMIN — ALBUTEROL SULFATE SCH MG: 2.5 SOLUTION RESPIRATORY (INHALATION) at 12:50

## 2019-12-29 RX ADMIN — ALBUTEROL SULFATE SCH MG: 2.5 SOLUTION RESPIRATORY (INHALATION) at 01:20

## 2019-12-29 NOTE — P.PN
Subjective


Date of Service: 12/29/19


Chief Complaint: feel better 


Subjective: No new changes, Improving, Doing well





Physical Examination





- Vital Signs


Temperature: 97.5 F


Blood Pressure: 93/50


Pulse: 77


Respirations: 20


Pulse Ox (%): 100





- Physical Exam


General: Alert, Oriented x3


HEENT: Atraumatic, Normocephalic


Neck: Supple, JVD not distended


Respiratory: Normal air movement, Diminished (right base )


Cardiovascular: Normal pulses, Regular rate/rhythm, Normal S1 S2


Gastrointestinal: Normal bowel sounds, Soft and benign


Musculoskeletal: No clubbing, No swelling


Integumentary: No rashes, No breakdown


Neurological: Normal gait, Normal speech, Normal strength at 5/5 x4 extr





- Studies





 Laboratory Last Values











WBC  15.8 K/uL (4.3-10.9)  H D 12/29/19  05:26    


 


RBC  3.04 M/uL (3.86-4.86)  L  12/29/19  05:26    


 


Hgb  9.7 g/dL (12.0-15.0)  L  12/29/19  05:26    


 


Hct  29.0 % (36.0-45.0)  L D 12/29/19  05:26    


 


MCV  95.4 fL ()   12/29/19  05:26    


 


MCH  31.9 pg (27.0-35.0)   12/29/19  05:26    


 


MCHC  33.5 g/dL (32.0-36.0)   12/29/19  05:26    


 


RDW  13.8 % (12.1-15.2)   12/29/19  05:26    


 


Plt Count  164 K/uL (152-406)  D 12/29/19  05:26    


 


MPV  9.6 fL (7.6-11.3)   12/29/19  05:26    


 


Neutrophils %  75.6 % (41.7-73.7)  H  12/29/19  05:26    


 


Lymphocytes %  16.9 % (15.3-44.8)   12/29/19  05:26    


 


Monocytes %  6.9 % (3.3-12.3)   12/29/19  05:26    


 


Eosinophils %  0.4 % (0-4.4)   12/29/19  05:26    


 


Basophils %  0.2 % (0-1.3)   12/29/19  05:26    


 


Absolute Neutrophils  11.9 K/uL (1.8-8.0)  H  12/29/19  05:26    


 


Absolute Lymphocytes  2.7 K/uL (0.7-4.9)   12/29/19  05:26    


 


Absolute Monocytes  1.1 K/uL (0.1-1.3)   12/29/19  05:26    


 


Absolute Eosinophils  0.1 K/uL (0-0.5)   12/29/19  05:26    


 


Absolute Basophils  0.0 K/uL (0-0.5)   12/29/19  05:26    


 


PT  12.2 SECONDS (9.5-12.5)   12/28/19  10:15    


 


INR  1.04   12/28/19  10:15    


 


Sodium  146 mmol/L (136-145)  H  12/29/19  05:26    


 


Potassium  4.1 mmol/L (3.5-5.1)   12/29/19  05:26    


 


Chloride  119 mmol/L ()  H  12/29/19  05:26    


 


Carbon Dioxide  22 mmol/L (21-32)   12/29/19  05:26    


 


BUN  24 mg/dL (7-18)  H  12/29/19  05:26    


 


Creatinine  1.18 mg/dL (0.55-1.3)   12/29/19  05:26    


 


Estimated GFR  47 mL/min (=/>90)  L  12/29/19  05:26    


 


Glucose  85 mg/dL ()   12/29/19  05:26    


 


Lactic Acid  1.9 mmol/L (0.4-2.0)   12/28/19  12:15    


 


Calcium  8.0 mg/dL (8.5-10.1)  L  12/29/19  05:26    


 


Magnesium  1.7 mg/dL (1.8-2.4)  L  12/28/19  10:15    


 


Total Bilirubin  0.3 mg/dL (0.2-1.0)   12/29/19  05:26    


 


Direct Bilirubin  0.2 mg/dL (0-0.2)   12/28/19  10:15    


 


AST  9 U/L (15-37)  L  12/29/19  05:26    


 


ALT  13 U/L (12-78)   12/29/19  05:26    


 


Alkaline Phosphatase  64 U/L ()   12/29/19  05:26    


 


Rapid Troponin I  < 0.02 ng/mL (0.0-0.045)   12/28/19  10:15    


 


Troponin I  < 0.02 ng/mL (0.0-0.045)   12/29/19  05:26    


 


NT-Pro-B Natriuret Pep  239 pg/mL (<125)  H  12/28/19  10:15    


 


Serum Total Protein  5.0 g/dL (6.4-8.2)  L  12/29/19  05:26    


 


Albumin  2.5 g/dL (3.4-5.0)  L  12/29/19  05:26    


 


Globulin  2.5 g/dL (2.3-3.5)   12/29/19  05:26    


 


Albumin/Globulin Ratio  1.0  (1.1-1.8)  L  12/29/19  05:26    


 


Lipase  108 U/L ()   12/28/19  10:15    


 


Urine pH  5.0  (5.0-7.0)   12/28/19  13:28    


 


Ur Specific Gravity  1.020  (1.005-1.030)   12/28/19  13:28    


 


Urine Ketones  Negative  (NEG)   12/28/19  13:28    


 


Urine Blood  Trace  (NEG)  H  12/28/19  13:28    


 


Urine Nitrite  Negative  (NEG)   12/28/19  13:28    


 


Ur Leukocyte Esterase  Negative  (NEG)   12/28/19  13:28    


 


Ur Random Sodium  115 mmol/L ()   12/28/19  21:58    


 


Urine Creatinine  138.0 mg/dL ()   12/28/19  13:50    


 


Urine Glucose  Negative  (NEG)   12/28/19  13:28    


 


Urine Total Protein  Negative  (NEG)   12/28/19  13:28    


 


Vancomycin Trough  14.0 ug/mL (5.0-20.0)   12/29/19  05:26    














Assessment & Plan





- Problems (Diagnosis)


(1) ARF (acute renal failure) with tubular necrosis


Current Visit: Yes   Status: Acute   





(2) Hyperglycemia


Current Visit: Yes   Status: Acute   





(3) Right middle lobe pneumonia


Current Visit: Yes   Status: Acute   





(4) Sepsis associated hypotension


Current Visit: Yes   Status: Acute   





(5) Single kidney


Current Visit: Yes   Status: Acute   


Physician Review: Patient Assessed, Agree with Above Assessment and Plan


Physician Review Additional Text: 





# Right middle and lower lobe pneumonia -improving 


-resolcing sepsis 


-c/w triople abx regime 





# ARF on baseline CKD -from single lkidney and pre-renal - improving 


-c/w IVF





# HTN-continue to hold meds for now 





# Hyperglycemia - start po intake , follow glucose trend

## 2019-12-29 NOTE — EKG
Test Date:    2019-12-28               Test Time:    09:23:41

Technician:   SWG                                    

                                                     

MEASUREMENT RESULTS:                                       

Intervals:                                           

Rate:         113                                    

UT:           118                                    

QRSD:         90                                     

QT:           324                                    

QTc:          444                                    

Axis:                                                

P:            52                                     

UT:           118                                    

QRS:          -23                                    

T:            38                                     

                                                     

INTERPRETIVE STATEMENTS:                                       

                                                     

Sinus tachycardia

Otherwise normal ECG

No previous ECG available for comparison



Electronically Signed On 12-29-19 11:42:15 CST by Dieudonne Montanez

## 2019-12-29 NOTE — HP
Date of Admission:  12/28/2019



Presenting Complaint:  Weakness and chest pain.



History Of Present Illness:  Jeanne Jett is a 59-year-old  female with past medical h
istory of GERD, on Protonix b.i.d.; recently travel to this area from [facility]; history of right
 kidney nephrectomy for reflux; nephropathy 15 years ago, who presented to the hospital because of ri
ght-sided radiating to substernal chest pain associated with weakness since yesterday.  Patient is al
so complaining of shortness of breath, initially on exertion, but later at rest.  She described her c
hest pain as pleuritic.  Patient initially denies any cough, but sister at bedside states the patient
 has been having dry hacking cough since the last 3 days.  Patient denies any sputum production.  She
 denies any recent contact.  She denies any recent travel since the last 1 month.  She states she has
 history of recurrent pneumonia in the past.  Last episode was about 2 years ago.  She denies any reg
ular antibiotics use.  In the ER, she was noted with marked hypoxia with requiring 2 L nasal cannula 
as well as hypotension with systolic down to the 70s/40s.  She has received 3 L of IV fluid bolus and
 her blood pressure is still ranging from 80-90 systolic.  She states chest pain somewhat feels liz
r.  CT of the chest shows evidence of right middle and upper lobe pneumonia.



Past Medical History:  Significant for,

1.GERD.

2.Reflux nephropathy, status post right nephrectomy.

3.History of chronic back pain.



Past Surgical History:  Right nephrectomy.



Family History:  Significant for diabetes in both parents.



Home Medications:  Percocet p.r.n., Lyrica as needed, as well as Prilosec 40 b.i.d.



Allergies:  ERYTHROMYCIN.



Social History:  Patient denies any history of tobacco, alcohol, or illicit drug use.  She normally l
[place] in [place] on vacation in the area since the last 2 months.



Review of Systems:

All systems reviewed x14 were negative except as mentioned above.



Physical Examination:

Current Vitals Sign:  Blood pressure of 86/53, pulse is down to 83 from initial admission of 01/15, O
2 saturation is 96% on 2 L nasal cannula.  Temperature is afebrile at 98.2. 

General:  Obese, elderly looking female, on nasal cannula O2. 

Head:  Atraumatic, normocephalic.  Pupils equal, reactive to light.  Slightly moist oral mucosa. 

Neck:  No JVD.  No carotid bruit. 

Respiratory:  Good air entry except for mild increase egophony over the right base.  No wheezing.  No
 rhonchi. 

Cardiovascular:  S1, S2.  Non-tachycardic. 

GI:  Abdomen full, soft, nontender.  Bowel sounds positive.  No epigastric tenderness.  No CVA tender
ness.  No suprapubic fullness. 

Extremities:  No pedal edema.  No calf tenderness. 

Neurologic:  Patient is alert, conversant.  No neurological focal motor deficit.



Laboratory Data:  EKG on initial presentation showed sinus tachy at 113 beats per minute.  No ST-segm
ent changes.  Repeat EKG, now heart rate down to 99 beats per minute.  Relatively unchanged. 



Chest x-ray shows right upper lobe pneumonia.  CT of abdomen and pelvis as well as chest without cont
rast consistent with a moderate-sized right upper lobe pneumonia with small right lower lobe pneumoni
a.  No crepitation __________, only ventral hernia seen, postsurgical fusion changes at L3-S1 with de
generative changes also noted. 



Rest of labs:  WBC 8.8, neutrophils 84%, no bands.  Platelet 208, hemoglobin 11.8.  INR 1.04.  Potass
ium 3.9, creatinine 1.79, glucose 171.  ProBNP of 239, lipase 108.  AST and ALT normal.



Impression:  

1.Right multilobar pneumonia.

2.Septic shock with hypotension.

3.Hypotension.

4.Gastroesophageal reflux disease.

5.Acute renal failure.

6.History of single left kidney.



Plan:  

1.Pneumonia with sepsis.  We will admit patient to the ICU since the persistent borderline low blood
 pressure.  We will continue IV fluid boluses.  We gave another 2 L bolus now.

2.History of persistent hypotension.  We will start patient on Levophed drip.  Follow blood culture.
  We brought in antibiotics to include vancomycin, Zosyn, as well as Levaquin for coverage at this ti
me.  We will do Tylenol p.r.n. as needed.  We will obtain sputum culture if feasible for Gram stain. 
 We will continue to wean O2 as needed.

3.Hypotension.  We do IV fluids and Levophed as needed.

4.Acute renal failure, likely due to ATN from hypotension, possibility also due to ATN from sepsis. 
 Unclear if patient has baseline CKD despite having single kidney as patient repeatedly state her pre
vious lab work usually normal.  We will obtain random urine sodium and creatinine to calculate fracti
onal excretion of sodium.  We will obtain renal sonogram to further evaluate single left kidney.  We 
will follow daily BMP.

5.Gastroesophageal reflux disease.  Continue PPI.

6.Advanced directive discussed with patient.  Patient wished to be full code.

7.DVT prophylaxis with subcutaneous Lovenox.  Low risk of PE at this time.  Given evidence of multil
obar pneumonia, we discontinue current heparin drip. 



Total time spent in review of record, discussion with patient, and evaluation greater than 60 minutes
.





EO/MODL

DD:  12/28/2019 13:10:42Voice ID:  [id number]

## 2019-12-30 LAB
ALBUMIN SERPL BCP-MCNC: 2.5 G/DL (ref 3.4–5)
ALP SERPL-CCNC: 75 U/L (ref 45–117)
ALT SERPL W P-5'-P-CCNC: 14 U/L (ref 12–78)
AST SERPL W P-5'-P-CCNC: 11 U/L (ref 15–37)
BUN BLD-MCNC: 17 MG/DL (ref 7–18)
FERRITIN SERPL-MCNC: 127.4 NG/ML (ref 8–388)
GLUCOSE SERPLBLD-MCNC: 85 MG/DL (ref 74–106)
HCT VFR BLD CALC: 29.9 % (ref 36–45)
IRON SERPL-MCNC: 40 UG/DL (ref 50–170)
LYMPHOCYTES # SPEC AUTO: 2.9 K/UL (ref 0.7–4.9)
PMV BLD: 10 FL (ref 7.6–11.3)
POTASSIUM SERPL-SCNC: 4 MMOL/L (ref 3.5–5.1)
RBC # BLD: 3.13 M/UL (ref 3.86–4.86)
TRANSFERRIN SERPL-MCNC: 134 MG/DL (ref 200–360)
TROPONIN I: < 0.02 NG/ML (ref 0–0.04)
TSH SERPL DL<=0.05 MIU/L-ACNC: 3.04 UIU/ML (ref 0.36–3.74)

## 2019-12-30 RX ADMIN — Medication SCH ML: at 00:32

## 2019-12-30 RX ADMIN — PANTOPRAZOLE SODIUM SCH MG: 40 TABLET, DELAYED RELEASE ORAL at 08:46

## 2019-12-30 RX ADMIN — Medication SCH ML: at 21:10

## 2019-12-30 RX ADMIN — Medication SCH ML: at 08:47

## 2019-12-30 RX ADMIN — GUAIFENESIN SCH MG: 600 TABLET, EXTENDED RELEASE ORAL at 08:46

## 2019-12-30 RX ADMIN — METOPROLOL TARTRATE PRN MG: 5 INJECTION INTRAVENOUS at 03:15

## 2019-12-30 RX ADMIN — PANTOPRAZOLE SODIUM SCH MG: 40 TABLET, DELAYED RELEASE ORAL at 16:17

## 2019-12-30 RX ADMIN — FAMOTIDINE SCH MG: 20 TABLET, FILM COATED ORAL at 08:46

## 2019-12-30 RX ADMIN — TAZOBACTAM SODIUM AND PIPERACILLIN SODIUM SCH MLS: 250; 2 INJECTION, SOLUTION INTRAVENOUS at 00:31

## 2019-12-30 RX ADMIN — TAZOBACTAM SODIUM AND PIPERACILLIN SODIUM SCH MLS: 250; 2 INJECTION, SOLUTION INTRAVENOUS at 17:16

## 2019-12-30 RX ADMIN — TAZOBACTAM SODIUM AND PIPERACILLIN SODIUM SCH MLS: 250; 2 INJECTION, SOLUTION INTRAVENOUS at 08:44

## 2019-12-30 RX ADMIN — METOPROLOL TARTRATE PRN MG: 5 INJECTION INTRAVENOUS at 11:19

## 2019-12-30 RX ADMIN — ENOXAPARIN SODIUM SCH MG: 100 INJECTION SUBCUTANEOUS at 16:18

## 2019-12-30 RX ADMIN — GUAIFENESIN SCH MG: 600 TABLET, EXTENDED RELEASE ORAL at 21:10

## 2019-12-30 RX ADMIN — ENOXAPARIN SODIUM SCH MG: 100 INJECTION SUBCUTANEOUS at 05:18

## 2019-12-30 NOTE — P.PN
Subjective


Date of Service: 12/30/19


Primary Care Provider: unknown


Chief Complaint: feel better 


Subjective: Other (Patient went into AFib last night.  Patient stable this 

time.  Shortness of breath has improved.)





Physical Examination





- Vital Signs


Temperature: 97.4 F


Blood Pressure: 128/83


Pulse: 137


Respirations: 20


Pulse Ox (%): 98





- Physical Exam


General: Alert, In no apparent distress, Oriented x3, Cooperative


HEENT: Atraumatic


Neck: Supple


Respiratory: Crackles/rales (To the right base)


Cardiovascular: Irregular heart rate/rhythm (AFib with rate around 120)


Gastrointestinal: Normal bowel sounds, No masses, No rebound, No guarding


Musculoskeletal: No erythema, No tenderness, No warmth


Integumentary: No significant lesion, No tenderness/swelling, No erythema, No 

warmth, No cyanosis


Neurological: Normal speech, Normal strength at 5/5 x4 extr, Normal tone, 

Normal affect





- Studies


Medications List Reviewed: Yes





Assessment & Plan


Discharge Plan: Home


Plan to discharge in: 24 Hours


Physician Review Additional Text: 





Impression:


New onset atrial fibrillation


Right upper and lower lobe pneumonia


Acute on chronic renal failure stage 3 with history of solitary kidney


Hypertension


Hypoglycemia





Plan:


New onset atrial fibrillation:  Patient started on IV metoprolol last night.  

Cardiology consulted.  Await further recommendation.  Patient also started on 

Lovenox at 1 milligram/kilogram subcu twice daily.  Patient will likely require 

chronic anti coagulation therapy at discharge. Medications including 

antibiotics had been adjusted.  Patient no longer on Levaquin.  Will order 

echocardiogram.  Wean off oxygen.  Will monitor closely.  Anticipate discharge 

in the next 24-48 hr with clinical improvement.


Right upper and lower lobe pneumonia:  Continue wean off oxygen.  Antibiotics 

adjusted.  Patient now on Zosyn.  Recheck chest x-ray.  Will monitor closely.


Acute on chronic renal failure stage 3 with history of solitary kidney:  IV 

fluids adjusted.  Patient with history of solitary kidney.  Will consult 

nephrology for further recommendation.


Hypertension:  Continue medication.  Will monitor closely.


Hypoglycemia:  Encourage oral intake.


Time Spent Managing Pts Care (In Minutes): 55

## 2019-12-30 NOTE — P.PN
Subjective


Date of Service: 12/30/19


Chief Complaint: feel better 


Subjective: Other (nursing reports that patient is now in A fib. No hx of this 

in past. Clinically stable.)





Physical Examination





- Vital Signs


Temperature: 98.2 F


Blood Pressure: 125/66


Pulse: 86


Respirations: 17


Pulse Ox (%): 92





Assessment & Plan


Discharge Plan: Home


Plan to discharge in: 48 Hours


Physician Review Additional Text: 





New onset Atrial fib: Will start Metoprolol IV PRN. Will increase Lovenox to 1 

mg/kg sc BID. Will order ECHO and consult Cardiology in the am. Will also DC 

Levaquin. 





Right middle and lower lobe pneumonia -improving 


-resolving sepsis 


-DC Levaquin and Vanco. Continue with Zosyn.  





ARF on baseline CKD -from single lkidney and pre-renal - improving 


-IV fluids adjusted. Will monitor renal function.





HTN-DC Hydralazine since she will be on Metoprolol. 





Hyperglycemia - monitor closely. 


Time Spent Managing Pts Care (In Minutes): 25

## 2019-12-30 NOTE — EKG
Test Date:    2019-12-30               Test Time:    10:52:55

Technician:   DINA                                     

                                                     

MEASUREMENT RESULTS:                                       

Intervals:                                           

Rate:         141                                    

GA:                                                  

QRSD:         88                                     

QT:           282                                    

QTc:          431                                    

Axis:                                                

P:                                                   

GA:                                                  

QRS:          13                                     

T:            66                                     

                                                     

INTERPRETIVE STATEMENTS:                                       

                                                     

Atrial fibrillation with rapid ventricular response

Abnormal ECG

Compared to ECG 12/30/2019 02:45:31

T-wave abnormality no longer present



Electronically Signed On 12-30-19 11:36:54 CST by Chris Swann

## 2019-12-30 NOTE — ECHO
HEIGHT: 5 ft 2 in   WEIGHT: 200 lb 0.054 oz   DATE OF STUDY: 12/30/2019   REFER DR: 
Miguel De La O DO

2-DIMENSIONAL: YES

         M.MODE: YES

     DOPPLER: YES

    COLOR FLOW: YES

    

                        TDS:  NO

    PORTABLE: NO

     DEFINITY:  NO

    BUBBLE STUDY: NO

    

    

DIAGNOSIS:  NEW ONSET ATRIAL FIBRILLATION



CARDIAC HISTORY:  

CATHERIZATION: NO

    SURGERY: NO

    PROSTHETIC VALVE: NO

    PACEMAKER: NO

    

    

MEASUREMENTS (cm)

    DIASTOLIC (NORMALS)                 SYSTOLIC (NORMALS)

IVSd                 1.1 (0.6-1.2)                    LA Diam 3.9 (1.9-4.0)     LVEF       
  65%  

LVIDd               4.9 (3.5-5.7)                        LVIDs      3.2 (2.0-3.5)     %FS  
        36%

LVPWd             1.2 (0.6-1.2)

Ao Diam           3.0 (2.0-3.7)



2 DIMENSIONAL ASSESSMENT:

RIGHT ATRIUM:                   NORMAL

    LEFT ATRIUM:       NORMAL

    

    RIGHT VENTRICLE:            NORMAL

    LEFT VENTRICLE: NORMAL

    

    TRICUSPID VALVE:             NORMAL

    MITRAL VALVE:     NORMAL

    

    PULMONIC VALVE:             NORMAL

    AORTIC VALVE:     NORMAL

    

    PERICARDIAL EFFUSION: NONE

    AORTIC ROOT:      NORMAL

    

    

LEFT VENTRICULAR WALL MOTION:     NORMAL



DOPPLER/COLOR FLOW:     MILD MITRAL AND TRICUSPID REGURGITATION. NORMAL RIGHT VENTRICULAR 
SYSTOLIC PRESSURE. 



COMMENTS:      NORMAL 2D ECHOCARDIOGRAM. MILD MITRAL AND TRICUSPID REGURGITATION. ATRIAL 
FIBRILLATION WITH RAPID VENTRICULAR RESPONSE. HEART RATE 120-140 BEATS PER MINUTE.



TECHNOLOGIST:   HERBERTH SEVILLA

## 2019-12-30 NOTE — EKG
Test Date:    2019-12-28               Test Time:    10:05:36

Technician:   SANDRITA                                    

                                                     

MEASUREMENT RESULTS:                                       

Intervals:                                           

Rate:         93                                     

MO:           130                                    

QRSD:         90                                     

QT:           348                                    

QTc:          432                                    

Axis:                                                

P:            44                                     

MO:           130                                    

QRS:          13                                     

T:            30                                     

                                                     

INTERPRETIVE STATEMENTS:                                       

                                                     

Normal sinus rhythm

Low voltage QRS

Borderline ECG

Compared to ECG 12/28/2019 09:23:41

Low QRS voltage now present

Sinus tachycardia no longer present



Electronically Signed On 12-30-19 10:21:08 CST by Chris Swann

## 2019-12-30 NOTE — EKG
Test Date:    2019-12-30               Test Time:    02:45:31

Technician:   RT HOPKINS                                   

                                                     

MEASUREMENT RESULTS:                                       

Intervals:                                           

Rate:         146                                    

SD:                                                  

QRSD:         92                                     

QT:           302                                    

QTc:          470                                    

Axis:                                                

P:                                                   

SD:                                                  

QRS:          7                                      

T:            91                                     

                                                     

INTERPRETIVE STATEMENTS:                                       

                                                     

Atrial fibrillation with rapid ventricular response

Low voltage QRS

Abnormal QRS-T angle, consider primary T wave abnormality

Abnormal ECG

Compared to ECG 12/28/2019 10:05:36

T-wave abnormality now present

Sinus rhythm no longer present



Electronically Signed On 12-30-19 11:36:57 CST by Chris Swann

## 2019-12-30 NOTE — CON
History Of Present Illness:  Ms. Jett is 59.  She came to the hospital with shortness of breath an
d generalized feeling of not feeling well.  At one point, she had enough weakness.  She could not jocy
lly stand up.  She was found to be hypotensive and has been in the hospital for several days and then
 last night, had an episode of atrial fibrillation.  I believe she is in sinus rhythm now.  She is no
t hooked up telemetry presently.  She does not recall having atrial fibrillation in the past.  She wa
s treated for hypertension at one point, but that seemed to go away.  She has depression and anxiety.




Medications:  She takes estradiol, pregabalin, omeprazole, tizanidine, oxycodone, acetaminophen, prog
esterone, and Wellbutrin.



Social History:  She uses no tobacco, alcohol, or illegal drugs.



Physical Examination:

Vital Signs:  4 feet 2 inches, 200 pounds. 

General:  Alert, oriented, pleasant, cooperative, obese, not in distress. 

Lungs:  Clear. 

Cardiac:  Regular rate and rhythm. 

Abdomen:  Soft. 

Extremities:  Normal.  No cyanosis, clubbing, or edema.



Assessment And Plan:  I believe the patient had transient atrial fibrillation.  We will get a repeat 
EKG.  We should do an echocardiogram and I will recommend that she tries to get on an antiarrhythmic 
drug.  If her echocardiogram shows ejection fraction is normal, we will try flecainide 50 b.i.d. and 
consider long-term 

anticoagulation.  She has a CHADS-VASc risk score of 0, so we can treat her with aspirin and flecaini
de as the only medications.





MARGUERITE

DD:  12/30/2019 10:34:16Voice ID:  [id number]

DT:  12/30/2019 15:00:27Report ID:  [id number]

## 2019-12-30 NOTE — RAD REPORT
EXAM DESCRIPTION:  RAD - Chest Pa And Lat (2 Views) - 12/30/2019 8:24 am

 

CLINICAL HISTORY:  Follow up pneumonia, pneumonia, shortness of breath

 

COMPARISON:  December 28 CT and portable chest

 

TECHNIQUE:  PA and lateral views of the chest were obtained.

 

FINDINGS:  The lungs are underinflated. Right upper lobe pneumonia changes are still present. Little 
interval change has occurred. Small or right base pneumonia changes also appear stable. No developing
 failure or volume overload seen.   Heart size is normal and central vasculature is within normal buenrostro
its.  No pleural effusion or pneumothorax seen.  Prominent degenerative changes are present. Neurosti
mulator wires are in place. There is accentuated kyphosis at the thoracolumbar junction. No aortic ab
normality.

 

IMPRESSION:  Right upper lobe and right lung base pneumonia findings not substantially different from
 comparison.

## 2019-12-31 VITALS — SYSTOLIC BLOOD PRESSURE: 138 MMHG | DIASTOLIC BLOOD PRESSURE: 88 MMHG

## 2019-12-31 VITALS — TEMPERATURE: 98.4 F

## 2019-12-31 VITALS — OXYGEN SATURATION: 97 %

## 2019-12-31 LAB
BUN BLD-MCNC: 12 MG/DL (ref 7–18)
GLUCOSE SERPLBLD-MCNC: 101 MG/DL (ref 74–106)
HCT VFR BLD CALC: 30.7 % (ref 36–45)
LYMPHOCYTES # SPEC AUTO: 2.9 K/UL (ref 0.7–4.9)
MAGNESIUM SERPL-MCNC: 1.7 MG/DL (ref 1.8–2.4)
PMV BLD: 9.9 FL (ref 7.6–11.3)
POTASSIUM SERPL-SCNC: 3.9 MMOL/L (ref 3.5–5.1)
RBC # BLD: 3.26 M/UL (ref 3.86–4.86)

## 2019-12-31 RX ADMIN — ENOXAPARIN SODIUM SCH MG: 100 INJECTION SUBCUTANEOUS at 17:06

## 2019-12-31 RX ADMIN — PANTOPRAZOLE SODIUM SCH MG: 40 TABLET, DELAYED RELEASE ORAL at 09:41

## 2019-12-31 RX ADMIN — GUAIFENESIN SCH MG: 600 TABLET, EXTENDED RELEASE ORAL at 09:40

## 2019-12-31 RX ADMIN — ENOXAPARIN SODIUM SCH MG: 100 INJECTION SUBCUTANEOUS at 04:56

## 2019-12-31 RX ADMIN — TAZOBACTAM SODIUM AND PIPERACILLIN SODIUM SCH MLS: 250; 2 INJECTION, SOLUTION INTRAVENOUS at 09:44

## 2019-12-31 RX ADMIN — TAZOBACTAM SODIUM AND PIPERACILLIN SODIUM SCH MLS: 250; 2 INJECTION, SOLUTION INTRAVENOUS at 00:42

## 2019-12-31 RX ADMIN — Medication SCH ML: at 09:39

## 2019-12-31 RX ADMIN — PANTOPRAZOLE SODIUM SCH MG: 40 TABLET, DELAYED RELEASE ORAL at 17:05

## 2019-12-31 RX ADMIN — FAMOTIDINE SCH MG: 20 TABLET, FILM COATED ORAL at 09:40

## 2019-12-31 RX ADMIN — METOPROLOL SUCCINATE SCH MG: 50 TABLET, EXTENDED RELEASE ORAL at 17:04

## 2019-12-31 RX ADMIN — METOPROLOL TARTRATE PRN MG: 5 INJECTION INTRAVENOUS at 02:15

## 2019-12-31 RX ADMIN — TAZOBACTAM SODIUM AND PIPERACILLIN SODIUM SCH: 250; 2 INJECTION, SOLUTION INTRAVENOUS at 17:00

## 2019-12-31 RX ADMIN — METOPROLOL SUCCINATE SCH MG: 50 TABLET, EXTENDED RELEASE ORAL at 06:21

## 2019-12-31 NOTE — P.CNS
Date of Consult: 12/31/19


Primary Care Provider: unknown


Chief Complaint: Pneumonia and AFib


History of Present Illness: 





Patient is 59 years of age.  She comes here frequently lives in [place] route of 

sudden onset of body aches felt very heavy:  Moves them easier to the emergency 

room, diagnosed with pneumonia and atrial fibrillation no prior history of AFib 

denies any fever or chills Rich has been admitted elevated patient has a 

history of reflux no prior history of cardiopulmonary disorders still is short 

of breath tachycardic


Allergies





erythromycin base Allergy (Verified 12/28/19 12:28)


 Hives





Home Medications: 








Omeprazole [Prilosec] 40 mg PO BID 12/28/19 


Oxycodone HCl/Acetaminophen [Percocet 5/325 Tab*] 7.5 mg PO Q6HR PRN 12/28/19 


Pregabalin 200 mg PO TID 12/28/19 


Progesterone, Micronized [Progesterone] 1 cap PO SEECOM 12/28/19 


Tizanidine HCl [Zanaflex] 4 mg PO Q8HR 12/28/19 


Wellbutrin Unknown Dosage  12/28/19 


estradioL [Estradiol] 0.5 mg PO DAILY 12/28/19 








- Past Medical/Surgical History


Diabetic: No


-: back pain


-: right kidney removed





- Social History


Place of Residence: Home





Review of Systems


10-point ROS is otherwise unremarkable


General: Weakness


Respiratory: Shortness of Breath





Physical Examination














Temp Pulse Resp BP Pulse Ox


 


 98.0 F   100 H  20   140/100 H  95 


 


 12/31/19 09:49  12/31/19 09:49  12/31/19 09:49  12/31/19 09:49  12/31/19 09:49








General: Alert, Mild distress


HEENT: Atraumatic


Neck: Supple


Respiratory: Diminished


Cardiovascular: No edema, Irregular heart rate/rhythm


Gastrointestinal: Normal bowel sounds, Soft and benign





- Problems


(1) Pneumonia


Current Visit: Yes   Status: Acute   


Plan: 


Patient is 59 years of age in need of a sudden onset of body aches and found to 

have pneumonia in the right upper lobe patient's white count is declining labs 

reviewed cultures negative sputum is pending can change to p.o. antibiotics 

avoid fluoroquinolones and macrolides consider combination of cefuroxime 500 mg 

twice a day.  Doxycycline for atypical coverage


Qualifiers: 


   Pneumonia type: due to unspecified organism   Laterality: right 





(2) Atrial fibrillation


Current Visit: Yes   Status: Acute   


Plan: 


Patient has new onset of AFib scheduled for cardioversion tomorrow seen by 

Cardiology did control on flecainide and Zosyn echocardiogram is normal


Qualifiers: 


   Atrial fibrillation type: paroxysmal   Qualified Code(s): I48.0 - Paroxysmal 

atrial fibrillation

## 2019-12-31 NOTE — P.PN
Subjective


Date of Service: 12/31/19


Primary Care Provider: unknown


Chief Complaint: feel better 


Subjective: No new changes, No C/O voiced (- c/o of wheezing and SOB today)





Review of Systems


Unremarkable





Physical Examination





- Vital Signs


Temperature: 98.0 F


Blood Pressure: 140/100


Pulse: 100


Respirations: 20


Pulse Ox (%): 95





- Physical Exam


General: Alert, Oriented x3


HEENT: Atraumatic, Normocephalic, PERRLA


Neck: Supple, 2+ carotid pulse no bruit


Respiratory: Normal air movement, Diminished, Expiratory wheezes


Cardiovascular: Normal pulses, Regular rate/rhythm, Normal S1 S2


Gastrointestinal: Normal bowel sounds, Soft and benign


Musculoskeletal: No clubbing, No swelling


Integumentary: No rashes, No breakdown


Neurological: Normal speech, Normal strength at 5/5 x4 extr, Normal tone





- Studies





 Laboratory Last Values











WBC  10.5 K/uL (4.3-10.9)  D 12/31/19  05:16    


 


RBC  3.26 M/uL (3.86-4.86)  L  12/31/19  05:16    


 


Hgb  10.4 g/dL (12.0-15.0)  L  12/31/19  05:16    


 


Hct  30.7 % (36.0-45.0)  L  12/31/19  05:16    


 


MCV  94.2 fL ()   12/31/19  05:16    


 


MCH  32.0 pg (27.0-35.0)   12/31/19  05:16    


 


MCHC  33.9 g/dL (32.0-36.0)   12/31/19  05:16    


 


RDW  13.7 % (12.1-15.2)   12/31/19  05:16    


 


Plt Count  174 K/uL (152-406)   12/31/19  05:16    


 


MPV  9.9 fL (7.6-11.3)   12/31/19  05:16    


 


Neutrophils %  64.8 % (41.7-73.7)   12/31/19  05:16    


 


Lymphocytes %  28.0 % (15.3-44.8)   12/31/19  05:16    


 


Monocytes %  5.4 % (3.3-12.3)   12/31/19  05:16    


 


Eosinophils %  1.3 % (0-4.4)   12/31/19  05:16    


 


Basophils %  0.5 % (0-1.3)   12/31/19  05:16    


 


Absolute Neutrophils  6.8 K/uL (1.8-8.0)   12/31/19  05:16    


 


Absolute Lymphocytes  2.9 K/uL (0.7-4.9)   12/31/19  05:16    


 


Absolute Monocytes  0.6 K/uL (0.1-1.3)   12/31/19  05:16    


 


Absolute Eosinophils  0.1 K/uL (0-0.5)   12/31/19  05:16    


 


Absolute Basophils  0.0 K/uL (0-0.5)   12/31/19  05:16    


 


PT  12.2 SECONDS (9.5-12.5)   12/28/19  10:15    


 


INR  1.04   12/28/19  10:15    


 


Sodium  147 mmol/L (136-145)  H  12/31/19  05:16    


 


Potassium  3.9 mmol/L (3.5-5.1)   12/31/19  05:16    


 


Chloride  119 mmol/L ()  H  12/31/19  05:16    


 


Carbon Dioxide  22 mmol/L (21-32)   12/31/19  05:16    


 


BUN  12 mg/dL (7-18)   12/31/19  05:16    


 


Creatinine  1.16 mg/dL (0.55-1.3)   12/31/19  05:16    


 


Estimated GFR  48 mL/min (=/>90)  L  12/31/19  05:16    


 


Glucose  101 mg/dL ()   12/31/19  05:16    


 


Hemoglobin A1c  4.8 % (4.2-6.3)   12/28/19  16:52    


 


Lactic Acid  1.0 mmol/L (0.4-2.0)   12/31/19  01:45    


 


Calcium  8.5 mg/dL (8.5-10.1)   12/31/19  05:16    


 


Magnesium  1.7 mg/dL (1.8-2.4)  L  12/31/19  05:16    


 


Iron  40.0 ug/dL ()  L  12/30/19  05:12    


 


TIBC  188 ug/dL (250-460)  L  12/30/19  05:12    


 


Transferrin  134 mg/dL (200-360)  L  12/30/19  05:12    


 


Transferrin % Sat  21.3 % (20.0-50.0)   12/30/19  05:12    


 


Ferritin  127.4 ng/mL (8-388)   12/30/19  05:12    


 


Total Bilirubin  0.3 mg/dL (0.2-1.0)   12/30/19  05:12    


 


Direct Bilirubin  0.2 mg/dL (0-0.2)   12/28/19  10:15    


 


AST  11 U/L (15-37)  L  12/30/19  05:12    


 


ALT  14 U/L (12-78)   12/30/19  05:12    


 


Alkaline Phosphatase  75 U/L ()   12/30/19  05:12    


 


Rapid Troponin I  < 0.02 ng/mL (0.0-0.045)   12/28/19  10:15    


 


Troponin I  < 0.02 ng/mL (0.0-0.045)   12/30/19  05:12    


 


NT-Pro-B Natriuret Pep  239 pg/mL (<125)  H  12/28/19  10:15    


 


Serum Total Protein  5.3 g/dL (6.4-8.2)  L  12/30/19  05:12    


 


Albumin  2.5 g/dL (3.4-5.0)  L  12/30/19  05:12    


 


Globulin  2.8 g/dL (2.3-3.5)   12/30/19  05:12    


 


Albumin/Globulin Ratio  0.9  (1.1-1.8)  L  12/30/19  05:12    


 


Lipase  108 U/L ()   12/28/19  10:15    


 


Vitamin B12  670 pg/mL (193-986)   12/30/19  05:12    


 


TSH  3.040 uIU/mL (0.360-3.740)   12/30/19  05:12    


 


Free T4  0.94 ng/dL (0.76-1.46)   12/30/19  05:12    


 


Urine pH  5.0  (5.0-7.0)   12/28/19  13:28    


 


Ur Specific Gravity  1.020  (1.005-1.030)   12/28/19  13:28    


 


Urine Ketones  Negative  (NEG)   12/28/19  13:28    


 


Urine Blood  Trace  (NEG)  H  12/28/19  13:28    


 


Urine Nitrite  Negative  (NEG)   12/28/19  13:28    


 


Ur Leukocyte Esterase  Negative  (NEG)   12/28/19  13:28    


 


Ur Random Sodium  115 mmol/L ()   12/28/19  21:58    


 


Urine Creatinine  138.0 mg/dL ()   12/28/19  13:50    


 


Urine Glucose  Negative  (NEG)   12/28/19  13:28    


 


Urine Total Protein  Negative  (NEG)   12/28/19  13:28    


 


Vancomycin Trough  5.4 ug/mL (5.0-20.0)   12/30/19  05:12    











Medications List Reviewed: Yes





Assessment & Plan





- Problems (Diagnosis)


(1) ARF (acute renal failure) with tubular necrosis


Current Visit: Yes   Status: Acute   





(2) Hyperglycemia


Current Visit: Yes   Status: Acute   





(3) Right middle lobe pneumonia


Current Visit: Yes   Status: Acute   





(4) Sepsis associated hypotension


Current Visit: Yes   Status: Acute   





(5) Single kidney


Current Visit: Yes   Status: Acute   


Physician Review: Patient Assessed, Agree with Above Assessment and Plan


Physician Review Additional Text: 





# Right multifocal PNA - improving , c/w abx 


- will add duonebs to wheezing tx today 


- no urgent need for steroids 





# Atrial fib -new onset and transient , on flecanide/aspirin , can dc lovenox 

at discharge 








# ARF on CKD- improving 





# HTN -controlled

## 2019-12-31 NOTE — PN
Date of Progress Note:  12/31/2019



She was admitted to Dr. De La O with pneumonia, weakness, and atrial fibrillation.  She is improved.  
Her blood pressure is normal.  She still have atrial fibrillation that is paroxysmal.  Today, she is 
still in atrial fibrillation at the rate of 140.  Dr. Swann started her on aspirin, flecainide 50 b.
i.d. yesterday.  I will increase her dose of flecainide to 100 b.i.d.  I prefer she goes home on an a
nticoagulant.  Her echo was normal.  We will see her in the office in the next 2 to 3 days.  If she r
emains in atrial fibrillation, we will do a cardioversion sometimes in the next week or 2.  I am rosalino mojica that her atrial fibrillation will resolve after her pneumonia resolves.





NB/JODY

DD:  12/31/2019 08:35:09Voice ID:  [id number]

DT:  12/31/2019 12:20:02Report ID:  [id number]

## 2019-12-31 NOTE — CON
Date of Consultation:  12/30/2019



Chief Complaint:  Acute kidney injury.



History Of Present Illness:  Patient denies previous history of kidney 
problems.  She presented to the hospital because of chest pain.  She was found 
to have elevated BUN and creatinine, BUN was 24, creatinine 1.18.  On arrival 
to the hospital, although creatinine was up to 1.79 and BUN was 33.  Patient 
was found to have hypernatremia, today sodium was 149, potassium 4.0, chloride 
122, CO2 of 20, BUN 17, creatinine 1.13.  Patient developed acute kidney injury 
on chronic kidney disease with prerenal azotemia.  Renal function has improved 
somewhat over the last 48 hours.  Patient is 59-year-old.  She came to the 
hospital because of shortness of breath, generalized weakness, fatigue, 
malaise.  She denies syncope, but she was found to have hypotensive episode and 
has atrial fibrillation.  She was complaining of palpitation and generalized 
weakness as well as slurred speech and pressure-like chest pain.  She has 
history of depression and anxiety.



Review of Systems:

Constitutional:  Denies fever or chills. 

Eyes:  Denies vision changes. 

Ears, Nose, Mouth and Throat:  Denies sore throat, earache. 

Respiratory:  Has shortness of breath, chest discomfort, and palpitation. 

GI:  Denies nausea, vomiting. 

:  Denies dysuria, hematuria. 

Musculoskeletal:  Denies muscle aches or joint swelling.  All other systems 
reviewed and all are negative.



Past Medical History:  GERD.  Patient has a history of right nephrectomy due to 
reflux nephropathy, history of chronic back pain.



Past Surgical History:  Right nephrectomy.



Family History:  Diabetes mellitus in both parents.



Social History:  Denies tobacco, alcohol, illicit drugs.



Physical Examination:

General:  Not in acute distress. 

Eyes:  Anicteric sclerae.  EOMI. 

Ears, Nose, Mouth, and Throat:  Oral mucosa moist.  No pallor. 

Neck:  Supple.  No bruits. 

Lungs:  Diminished breath sounds. 

Heart:  S1, S2. No pericardial friction rub.

Abdomen:  Soft, benign, nontender.  No rebound, no guarding. 

Extremities:  No edema, no clubbing, no cyanosis.

Skin; warm and dry, no oozing 

Neurologic: no tremor, CN intact

Laboratory Data:  Showed right upper lobe pneumonia.  CT scan of the abdomen 
and pelvis as well as chest x-ray showed moderate size right upper lobe 
pneumonia with small right lower lobe pneumonia.  CT scan was done without IV 
contrast.  WBC 8.4.  , lipase 108, creatinine 1.7, potassium 3.9.



Impression And Plan:  

1.   Multilobar pneumonia, septic shock with hypotension.  Patient received IV 
fluids for blood pressure support and to prevent renal hypoperfusion.  Renal 
function has improved and prerenal azotemia responded to adequate hydration.  
Patient was given bolus of 2 L on arrival to the hospital.  Patient is on broad-
spectrum antibiotics including vancomycin and Zosyn for coverage of community-
acquired pneumonia.  Patient will need vancomycin trough level.  Plan is to 
monitor renal function closely when patient is on vancomycin.

2.   Acute kidney injury, likely due to prerenal azotemia, acute tubular 
necrosis.  Patient will have renal ultrasound to check any possible evidence of 
obstructive uropathy.  At this point, renal function is improving.  Avoid 
nephrotoxic medication.  Patient cannot take nonsteroidal antiinflammatory 
medication.





FRANCIS/JODY

DD:  12/30/2019 22:27:47   Voice ID:  [id number]

DT:  12/31/2019 03:21:08   Report ID:  [id number]

CALLY

## 2019-12-31 NOTE — P.DS
Admission Date: 12/28/19


Discharge Date: 12/31/19


Primary Care Provider: unknown


Disposition: ROUTINE DISCHARGE


Discharge Condition: GOOD


Reason for Admission: Pneumonia and AFib


Consultations: 





Pulmonary-Dr. Lee


Cardiology-Dr. Montanez





Procedures: 





CT Scan: 


IMPRESSION:  Moderate-sized right upper lobe pneumonia with smaller right lower 

lobe pneumonia. No cavitation or complicating finding. 


No other significant chest finding. 


No acute abdominal or pelvic finding. Left kidney is absent. 


Umbilical and supraumbilical fat only ventral hernias seen. 


Postsurgical fusion changes at L3-S1 with extensive advanced for age 

degenerative disc and bone changes T11-L2.





CXR: 


FINDINGS:  The lungs are underinflated. Right upper lobe pneumonia changes are 

still present. Little interval change has occurred. Small or right base 

pneumonia changes also appear stable. No developing failure or volume overload 

seen.   Heart size is normal and central vasculature is within normal limits.  

No pleural effusion or pneumothorax seen.  Prominent degenerative changes are 

present. Neurostimulator wires are in place. There is accentuated kyphosis at 

the thoracolumbar junction. No aortic abnormality.


 


IMPRESSION:  Right upper lobe and right lung base pneumonia findings not 

substantially different from comparison.


ECHO: 


Ejection fraction 65%


LEFT VENTRICULAR WALL MOTION:     NORMAL


DOPPLER/COLOR FLOW:     MILD MITRAL AND TRICUSPID REGURGITATION. NORMAL RIGHT 

VENTRICULAR SYSTOLIC PRESSURE. 


COMMENTS:      NORMAL 2D ECHOCARDIOGRAM. MILD MITRAL AND TRICUSPID 

REGURGITATION. ATRIAL FIBRILLATION WITH RAPID VENTRICULAR RESPONSE. HEART RATE 

120-140 BEATS PER MINUTE.





Medical Problem List: 


New onset atrial fibrillation


Right upper and lower lobe pneumonia


Acute on chronic renal failure stage 3 with history of solitary kidney


Hypertension


Hypoglycemia


Chronic pain


Depression with anxiety


Brief History of Present Illness: 





59-year-old  female presented to the emergency room with shortness of 

breath.  Patient found to have pneumonia with respiratory failure.  Patient 

admitted to ICU.


Hospital Course: 





Patient presented with shortness of breath.  Patient had acute respiratory 

failure secondary to right upper lobe pneumonia.  During the course of her stay 

patient was treated with antibiotic therapy.  Patient seen and evaluated by 

pulmonology.  At discharge she did not require any oxygen.  Patient will 

continue with Cefuroxime 500 mg twice daily for 7 days.  Tessalon Perles 100 mg 

1 pill 3 times a day as needed for cough will be provided.  Recommend to follow 

up with pulmonology in 1-2 weeks to follow up this hospitalization.  Recommend 

to recheck chest x-ray in 2-4 weeks to monitor resolution.





During the course of her stay patient had new onset atrial fibrillation likely 

from the acute respiratory distress. Cardiology was consulted.  Echocardiogram 

shows normal ejection fraction.  Patient was given IV metoprolol.  Patient was 

transition to flecainide.  Medication was increased.  Cardiology had plan for 

cardioversion but patient was able to convert on her own.  At discharge patient 

will continue with flecainide 100 mg 1 pill twice daily and aspirin 81 mg 

daily.  Recommend follow up with cardiology within 1 week to follow up this 

hospitalization.





Patient with hypertension.  At discharge patient will continue with metoprolol 

XL 50 mg 1 pill twice daily.  Further adjustment can be done by Cardiology.





Patient with acute on chronic renal failure stage 3 with history of solitary 

kidney.  Nephrology was consulted.  Renal function improved.  Patient stable 

this time.  Recommend no further use of nonsteroidal anti-inflammatories.  

Future medications will need to be renally dosed.  Recommend to recheck lab-BMP 

in 1 week to follow up this hospitalization.  Recommend follow up with 

nephrology in 2 4 weeks to monitor progress.





Patient with chronic pain. At discharge she may continue with her medications 

including hydrocodone and Lyrica.  Recommend follow up with pain management.





Patient with depression and anxiety.  At discharge she may continue with 

medication.


Vital Signs/Physical Exam: 














Temp Pulse Resp BP Pulse Ox


 


 98.2 F   130 H  21 H  114/83   96 


 


 12/31/19 12:00  12/31/19 12:00  12/31/19 12:00  12/31/19 12:00  12/31/19 12:00








General: Alert, In no apparent distress, Oriented x3, Cooperative


HEENT: Atraumatic


Neck: Supple


Respiratory: Clear to auscultation bilaterally, Normal air movement


Cardiovascular: Normal pulses, Regular rate/rhythm


Gastrointestinal: Normal bowel sounds, Soft and benign, Non-distended, No masses

, No rebound, No guarding


Musculoskeletal: No erythema, No tenderness, No warmth


Integumentary: No tenderness/swelling, No erythema, No warmth, No cyanosis


Neurological: Normal speech, Normal strength at 5/5 x4 extr, Normal tone, 

Normal affect


Laboratory Data at Discharge: 














WBC  10.5 K/uL (4.3-10.9)  D 12/31/19  05:16    


 


Hgb  10.4 g/dL (12.0-15.0)  L  12/31/19  05:16    


 


Hct  30.7 % (36.0-45.0)  L  12/31/19  05:16    


 


Plt Count  174 K/uL (152-406)   12/31/19  05:16    


 


PT  12.2 SECONDS (9.5-12.5)   12/28/19  10:15    


 


INR  1.04   12/28/19  10:15    


 


Sodium  147 mmol/L (136-145)  H  12/31/19  05:16    


 


Potassium  3.9 mmol/L (3.5-5.1)   12/31/19  05:16    


 


BUN  12 mg/dL (7-18)   12/31/19  05:16    


 


Creatinine  1.16 mg/dL (0.55-1.3)   12/31/19  05:16    


 


Glucose  101 mg/dL ()   12/31/19  05:16    


 


Magnesium  1.7 mg/dL (1.8-2.4)  L  12/31/19  05:16    


 


Total Bilirubin  0.3 mg/dL (0.2-1.0)   12/30/19  05:12    


 


AST  11 U/L (15-37)  L  12/30/19  05:12    


 


ALT  14 U/L (12-78)   12/30/19  05:12    


 


Alkaline Phosphatase  75 U/L ()   12/30/19  05:12    


 


Troponin I  < 0.02 ng/mL (0.0-0.045)   12/30/19  05:12    


 


Lipase  108 U/L ()   12/28/19  10:15    








Home Medications: 








Omeprazole [Prilosec] 40 mg PO BID 12/28/19 


Oxycodone HCl/Acetaminophen [Percocet 5/325 Tab*] 7.5 mg PO Q6HR PRN 12/28/19 


Pregabalin 200 mg PO TID 12/28/19 


Progesterone, Micronized [Progesterone] 1 cap PO SEECOM 12/28/19 


Wellbutrin Unknown Dosage  12/28/19 


estradioL [Estradiol] 0.5 mg PO DAILY 12/28/19 


Benzonatate [Tessalon Perle*] 100 mg PO TID PRN #15 cap 12/31/19 


Cefuroxime Axetil [Cefuroxime] 500 mg PO BID #14 tab 12/31/19 


Flecainide [Tambocor*] 100 mg PO BID #60 tab 12/31/19 


Metoprolol Succinate [Toprol Xl*] 50 mg PO BID 6AM 6PM #60 tab 12/31/19 





New Medications: 


Benzonatate [Tessalon Perle*] 100 mg PO TID PRN #15 cap


 PRN Reason: Cough


Cefuroxime Axetil [Cefuroxime] 500 mg PO BID #14 tab


Flecainide [Tambocor*] 100 mg PO BID #60 tab


Metoprolol Succinate [Toprol Xl*] 50 mg PO BID 6AM 6PM #60 tab


Patient Discharge Instructions: 1.  Recommend follow up with PCP in 1 week to 

follow up this hospitalization.  2.  Patient presented with shortness of 

breath.  Patient had acute respiratory failure secondary to right upper lobe 

pneumonia.  During the course of her stay patient was treated with antibiotic 

therapy.  Patient seen and evaluated by pulmonology.  At discharge she did not 

require any oxygen.  Patient will continue with Cefuroxime 500 mg twice daily 

for 7 days.  Tessalon Perles 100 mg 1 pill 3 times a day as needed for cough 

will be provided.  Recommend to follow up with pulmonology in 1-2 weeks to 

follow up this hospitalization.  Recommend to recheck chest x-ray in 2-4 weeks 

to monitor resolution.  3.  During the course of her stay patient had new onset 

atrial fibrillation likely from the acute respiratory distress. Cardiology was 

consulted.  Echocardiogram shows normal ejection fraction.  Patient was given 

IV metoprolol.  Patient was transition to flecainide.  Medication was 

increased.  Cardiology had plan for cardioversion but patient was able to 

convert on her own.  At discharge patient will continue with flecainide 100 mg 

1 pill twice daily and aspirin 81 mg daily.  Recommend follow up with 

cardiology within 1 week to follow up this hospitalization.  4.  Patient with 

hypertension.  At discharge patient will continue with metoprolol XL 50 mg 1 

pill twice daily.  Further adjustment can be done by Cardiology.  5.  Patient 

with acute on chronic renal failure stage 3 with history of solitary kidney.  

Nephrology was consulted.  Renal function improved.  Patient stable this time.  

Recommend no further use of nonsteroidal anti-inflammatories.  Future 

medications will need to be renally dosed.  Recommend to recheck lab-BMP in 1 

week to follow up this hospitalization.  Recommend follow up with nephrology in 

2 4 weeks to monitor progress.  6.  Patient with chronic pain. At discharge she 

may continue with her medications including hydrocodone and Lyrica.  Recommend 

follow up with pain management.  7.  Patient with depression and anxiety.  At 

discharge she may continue with medication.


Diet: AHA


Activity: Ad zaida


Time spent managing pt's care (in minutes): 55

## 2020-01-01 NOTE — PN
Date of Progress Note:  12/31/2019



Chief Complaint:  Acute kidney injury.



History Of Present Illness:  Patient has history of solitary kidney.  She underwent nephrectomy.  She
 presented to the hospital because of chest pain.  She was feeling dizzy and she had slurred speech. 
 She was found to have hypernatremia, sodium level was 149, potassium 4.0, chloride 112, CO2 of 20, B
UN 17, creatinine 113.  The patient developed acute kidney injury on chronic kidney disease secondary
 to prerenal azotemia.  Patient was started on IV fluids and renal function has stabilized.



Review of Systems:

Denies fever, chills.



Physical Examination:

Lungs:  Clear to auscultation bilaterally. 

Heart:  S1, S2. 

Abdomen:  Soft.  Benign. 

Extremities:  No edema.



Laboratory Data:  , lipase 108, creatinine 1.7, potassium 3.9, WBC 8.4.



Impression And Plan:  

1.Multifocal multilobar pneumonia, septic shock with hypotension.  Patient received IV fluids to pre
vent renal hypoperfusion.  Continue adequate hydration.  Avoid nephrotoxic medication.

2.Acute kidney injury secondary to prerenal azotemia with acute 

tubular necrosis.  Ultrasound was done to rule out obstructive uropathy.  Avoid nephrotoxic medicatio
n.





FRANCIS/MODL

DD:  12/31/2019 21:05:09Voice ID:  [id number]

DT:  01/01/2020 00:52:36Report ID:  [id number]

## 2020-01-07 NOTE — EKG
Test Date:    2019-12-31               Test Time:    13:47:22

Technician:   DINA                                     

                                                     

MEASUREMENT RESULTS:                                       

Intervals:                                           

Rate:         79                                     

OH:           128                                    

QRSD:         92                                     

QT:           354                                    

QTc:          405                                    

Axis:                                                

P:            43                                     

OH:           128                                    

QRS:          6                                      

T:            31                                     

                                                     

INTERPRETIVE STATEMENTS:                                       

                                                     

Normal sinus rhythm

Normal ECG

Compared to ECG 12/30/2019 10:52:55

Atrial fibrillation no longer present



Electronically Signed On 01-07-20 16:40:00 CST by Dieudonne Montanez

## 2022-05-28 ENCOUNTER — HOSPITAL ENCOUNTER (EMERGENCY)
Dept: HOSPITAL 97 - ER | Age: 62
Discharge: HOME | End: 2022-05-28
Payer: COMMERCIAL

## 2022-05-28 VITALS — DIASTOLIC BLOOD PRESSURE: 84 MMHG | SYSTOLIC BLOOD PRESSURE: 137 MMHG | OXYGEN SATURATION: 95 %

## 2022-05-28 VITALS — TEMPERATURE: 98.1 F

## 2022-05-28 DIAGNOSIS — Z88.3: ICD-10-CM

## 2022-05-28 DIAGNOSIS — J06.9: Primary | ICD-10-CM

## 2022-05-28 DIAGNOSIS — Z20.822: ICD-10-CM

## 2022-05-28 PROCEDURE — 96372 THER/PROPH/DIAG INJ SC/IM: CPT

## 2022-05-28 PROCEDURE — 87070 CULTURE OTHR SPECIMN AEROBIC: CPT

## 2022-05-28 PROCEDURE — 87804 INFLUENZA ASSAY W/OPTIC: CPT

## 2022-05-28 PROCEDURE — 99284 EMERGENCY DEPT VISIT MOD MDM: CPT

## 2022-05-28 PROCEDURE — 87081 CULTURE SCREEN ONLY: CPT

## 2022-05-28 PROCEDURE — 71046 X-RAY EXAM CHEST 2 VIEWS: CPT

## 2022-05-28 NOTE — RAD REPORT
EXAM DESCRIPTION:  Jos Kilgore And Priyank (2 Views)5/28/2022 8:09 pm

 

CLINICAL HISTORY:  Cough

 

COMPARISON:  2019

 

FINDINGS:   The lungs appear clear of acute infiltrate. The heart is normal size.

 

A neurostimulator in place.

 

Cardiac monitor overlies the left chest

 

IMPRESSION:   No acute abnormalities displayed

## 2022-05-28 NOTE — EDPHYS
Physician Documentation                                                                           

 [facility]                                                                 

Name: Jeanne Jett                                                                                

Age: 62 yrs                                                                                       

Sex: Female                                                                                       

: 1960                                                                                   

MRN: [id number]                                                                                   

Arrival Date: 2022                                                                          

Time: 17:10                                                                                       

Account#: [id number]                                                                            

Bed 9                                                                                             

Private MD:                                                                                       

ED Physician Aidan Sandhu                                                                       

HPI:                                                                                              

                                                                                             

17:33 This 62 yrs old Female presents to ER via Ambulatory with complaints of Cough, Sore     pm1 

      throat.                                                                                     

17:33 The patient or guardian reports cough, with productive sputum, that is green, that is   pm1 

      yellow, sore throat. Onset: The symptoms/episode began/occurred 3 day(s) ago. Severity      

      of symptoms: in the emergency department the symptoms are unchanged. Modifying factors:     

      The symptoms are alleviated by nothing. Associated signs and symptoms: Pertinent            

      positives: sore throat, Pertinent negatives: chest pain, diarrhea, fever, vomiting. The     

      patient has not experienced similar symptoms in the past. The patient has not recently      

      seen a physician.                                                                           

                                                                                                  

Historical:                                                                                       

- Allergies:                                                                                      

17:31 Erythromycin;                                                                           ld1 

- PMHx:                                                                                           

17:31 Back pain;                                                                              ld1 

- PSHx:                                                                                           

17:31 None;                                                                                   ld1 

                                                                                                  

- Immunization history:: Adult Immunizations up to date, Client reports receiving the             

  2nd dose of the Covid vaccine.                                                                  

- Social history:: Smoking status: Patient denies any tobacco usage or history of.                

  Patient/guardian denies using alcohol.                                                          

                                                                                                  

                                                                                                  

ROS:                                                                                              

17:33 Cardiovascular: Negative for chest pain, palpitations, and edema.                       pm1 

17:33 Abdomen/GI: Negative for abdominal pain, nausea, vomiting, diarrhea, and constipation,      

      Back: Negative for injury and pain, MS/Extremity: Negative for injury and deformity,        

      Skin: Negative for injury, rash, and discoloration, Neuro: Negative for headache,           

      weakness, numbness, tingling, and seizure.                                                  

17:33 Constitutional: Positive for chills, Negative for poor PO intake.                           

17:33 ENT: Positive for sore throat, Negative for ear pain.                                       

17:33 Respiratory: Positive for cough, Negative for shortness of breath.                          

17:33 All other systems are negative.                                                             

                                                                                                  

Exam:                                                                                             

17:33 Constitutional:  This is a well developed, well nourished patient who is awake, alert,  pm1 

      and in no acute distress. Head/Face:  Normocephalic, atraumatic.                            

17:33 Back:  No spinal tenderness.  No costovertebral tenderness.  Full range of motion.          

      Skin:  Warm, dry with normal turgor.  Normal color with no rashes, no lesions, and no       

      evidence of cellulitis.                                                                     

17:33 MS/ Extremity:  Pulses equal, no cyanosis.  Neurovascular intact.  Full, normal range       

      of motion.                                                                                  

17:33 ENT: External ear(s): no acute changes, Ear canal(s): no acute changes, TM's: no acute      

      changes, Posterior pharynx: Airway: no evidence of obstruction, Tonsils: bilaterally        

      enlarged, with erythema, no exudate, no ulcerations, erythema, that is moderate,            

      peritonsillar mass, is not appreciated.                                                     

17:33 Cardiovascular: Exam negative for  acute changes, Rate: normal, Rhythm: regular,            

      Pulses: no pulse deficits are appreciated, Heart sounds: normal.                            

17:33 Respiratory: Exam negative for  acute changes, respiratory distress, shortness of           

      breath.                                                                                     

17:33 Abdomen/GI: Inspection: abdomen appears normal, Palpation: abdomen is soft and              

      non-tender, in all quadrants.                                                               

17:33 Neuro: Exam negative for acute changes, Orientation: is normal, Mentation: is normal,       

      Motor: is normal, moves all fours.                                                          

                                                                                                  

Vital Signs:                                                                                      

17:29  / 76; Pulse 81; Resp 18; Temp 98.1(O); Pulse Ox 97% on R/A; Weight 90.72 kg;     ld1 

      Height 5 ft. 2 in. (157.48 cm); Pain 3/10;                                                  

19:15  / 79; Pulse 79; Resp 18; Pulse Ox 97% on R/A;                                    ld1 

19:50  / 84; Pulse 81; Resp 18; Pulse Ox 95% on R/A;                                    fu  

17:29 Body Mass Index 36.58 (90.72 kg, 157.48 cm)                                             ld1 

                                                                                                  

MDM:                                                                                              

17:33 Patient medically screened.                                                             pm1 

20:33 Data reviewed: vital signs. Data interpreted: Pulse oximetry: on room air is 95 %.      pm1 

      Interpretation: normal. Counseling: I had a detailed discussion with the patient and/or     

      guardian regarding: the historical points, exam findings, and any diagnostic results        

      supporting the discharge/admit diagnosis, lab results, radiology results, the need for      

      outpatient follow up, to return to the emergency department if symptoms worsen or           

      persist or if there are any questions or concerns that arise at home.                       

                                                                                                  

                                                                                             

17:33 Order name: COVID-19 SARS RT PCR (Document "Date of Onset" if Symptomatic); Complete    pm1 

      Time: 18:42                                                                                 

                                                                                             

17:33 Order name: Flu; Complete Time: 18:42                                                   pm1 

                                                                                             

17:33 Order name: Strep; Complete Time: 18:00                                                 pm1 

                                                                                             

17:33 Order name: Chest Pa And Lat (2 Views) XRAY; Complete Time: 20:29                       pm1 

                                                                                             

17:58 Order name: Throat Culture                                                              EDMS

                                                                                                  

Administered Medications:                                                                         

17:41 Drug: Tussionex Pennkinetic ER (chlorpheniramine-hydrocodone) Suspension 5 ml Route: PO;ld1 

20:44 Follow up: Response: Pain is decreased                                                  fu  

20:44 Drug: Decadron (dexamethasone) 10 mg Route: IM; Site: right deltoid;                    fu  

                                                                                                  

                                                                                                  

Disposition:                                                                                      

                                                                                             

07:43 Co-signature as Attending Physician, Aidan Sandhu MD I agree with the assessment and   kdr 

      plan of care.                                                                               

                                                                                                  

Disposition Summary:                                                                              

22 20:34                                                                                    

Discharge Ordered                                                                                 

      Location: Home                                                                          pm1 

      Problem: new                                                                            pm1 

      Symptoms: have improved                                                                 pm1 

      Condition: Stable                                                                       pm1 

      Diagnosis                                                                                   

        - Acute upper respiratory infection, unspecified                                      pm1 

      Followup:                                                                               pm1 

        - With: Emergency Department                                                               

        - When: As needed                                                                          

        - Reason: Worsening of condition                                                           

      Followup:                                                                               pm1 

        - With: Private Physician                                                                  

        - When: 2 - 3 days                                                                         

        - Reason: Recheck today's complaints, Continuance of care, Re-evaluation by your           

      physician                                                                                   

      Discharge Instructions:                                                                     

        - Discharge Summary Sheet                                                             pm1 

        - Antibiotic Resistance                                                               pm1 

        - Upper Respiratory Infection, Adult                                                  pm1 

      Forms:                                                                                      

        - Medication Reconciliation Form                                                      pm1 

        - Thank You Letter                                                                    pm1 

        - Antibiotic Education                                                                pm1 

        - Prescription Opioid Use                                                             pm1 

      Prescriptions:                                                                              

        - Ventolin HFA 90 mcg/actuation Inhalation HFA aerosol inhaler                             

            - inhale 1 puff by INHALATION route every 4-6 hours As needed; 1 Inhaler;         pm1 

      Refills: 0, Product Selection Permitted                                                     

        - Medrol (Guido) 4 mg Oral Tablets, Dose Pack                                                

            - take 1 tablet by ORAL route as directed - follow package instructions; 1        pm1 

      packet; Refills: 0, Product Selection Permitted                                             

        - Guaifenesin AC  mg/5 mL Oral Liquid                                                

            - take 10 milliliters by ORAL route every 4 hours As needed; 240 milliliter;      pm1 

      Refills: 0, Product Selection Permitted                                                     

Signatures:                                                                                       

Dispatcher [employer]                           EDAidan Graves MD MD   kdr                                                  

Ramos Alvarado, NP                    NP   pm1                                                  

Sreekanth Jason RN RN fu Dibbern, Lauren, RN                     RN   ld1                                                  

                                                                                                  

**************************************************************************************************

## 2022-05-28 NOTE — ER
Nurse's Notes                                                                                     

 [facility]                                                                 

Name: Jeanne Jett                                                                                

Age: 62 yrs                                                                                       

Sex: Female                                                                                       

: 1960                                                                                   

MRN: [id number]                                                                                   

Arrival Date: 2022                                                                          

Time: 17:10                                                                                       

Account#: [id number]                                                                            

Bed 9                                                                                             

Private MD:                                                                                       

Diagnosis: Acute upper respiratory infection, unspecified                                         

                                                                                                  

Presentation:                                                                                     

                                                                                             

17:29 Chief complaint: Patient states: Cough and SOB X 3 days. Coronavirus screen: Client     ld1 

      presents with at least one sign or symptom that may indicate coronavirus-19.                

      Standard/surgical mask placed on the client. Ebola Screen: No symptoms or risks             

      identified at this time. Initial Sepsis Screen: Does the patient meet any 2 criteria?       

      No. Patient's initial sepsis screen is negative. Does the patient have a suspected          

      source of infection? No. Patient's initial sepsis screen is negative. Risk Assessment:      

      Do you want to hurt yourself or someone else? Patient reports no desire to harm self or     

      others. Onset of symptoms was May 28, 2022.                                                 

17:29 Method Of Arrival: Ambulatory                                                           ld1 

17:29 Acuity: PATRICIA 3                                                                           ld1 

                                                                                                  

Triage Assessment:                                                                                

17:31 General: Appears in no apparent distress. comfortable, Behavior is calm, cooperative,   ld1 

      appropriate for age. Pain: Denies pain. EENT: Throat is pink Reports sore throat.           

      Neuro: Level of Consciousness is awake, alert, obeys commands, Oriented to person,          

      place, time, situation. Cardiovascular: Capillary refill < 3 seconds Patient's skin is      

      warm and dry. Respiratory: Reports shortness of breath cough that is Airway is patent       

      Respiratory effort is even, unlabored, Onset: The symptoms/episode began/occurred           

      gradually, the patient has moderate shortness of breath. GI: Abdomen is round               

      non-distended.                                                                              

                                                                                                  

Historical:                                                                                       

- Allergies:                                                                                      

17:31 Erythromycin;                                                                           ld1 

- PMHx:                                                                                           

17:31 Back pain;                                                                              ld1 

- PSHx:                                                                                           

17:31 None;                                                                                   ld1 

                                                                                                  

- Immunization history:: Adult Immunizations up to date, Client reports receiving the             

  2nd dose of the Covid vaccine.                                                                  

- Social history:: Smoking status: Patient denies any tobacco usage or history of.                

  Patient/guardian denies using alcohol.                                                          

                                                                                                  

                                                                                                  

Screenin:16 Abuse screen: Denies threats or abuse. Denies injuries from another. Nutritional        ld1 

      screening: No deficits noted. Tuberculosis screening: No symptoms or risk factors           

      identified. Fall Risk None identified.                                                      

                                                                                                  

Assessment:                                                                                       

19:15 Reassessment: See triage assessment. Cardiovascular: Capillary refill < 3 seconds       ld1 

      Patient's skin is warm and dry. Rhythm is regular. Respiratory: Airway is patent            

      Respiratory effort is even, unlabored, Breath sounds are coarse bilaterally.                

20:45 Reassessment: Patient is alert, oriented x 3, equal unlabored respirations, skin        fu  

      warm/dry/pink. Patient states feeling better.                                               

                                                                                                  

Vital Signs:                                                                                      

17:29  / 76; Pulse 81; Resp 18; Temp 98.1(O); Pulse Ox 97% on R/A; Weight 90.72 kg;     ld1 

      Height 5 ft. 2 in. (157.48 cm); Pain 3/10;                                                  

19:15  / 79; Pulse 79; Resp 18; Pulse Ox 97% on R/A;                                    ld1 

19:50  / 84; Pulse 81; Resp 18; Pulse Ox 95% on R/A;                                    fu  

17:29 Body Mass Index 36.58 (90.72 kg, 157.48 cm)                                             ld1 

                                                                                                  

ED Course:                                                                                        

17:10 Patient arrived in ED.                                                                  ja2 

17:17 Ramos Alvarado NP is PHCP.                                                           pm1 

17:17 Aidan Sandhu MD is Attending Physician.                                              pm1 

17:31 Triage completed.                                                                       ld1 

17:31 Arm band placed on right wrist.                                                         ld1 

17:34 COVID-19 SARS RT PCR (Document "Date of Onset" if Symptomatic) Sent.                    ld1 

17:34 Flu Sent.                                                                               ld1 

17:34 Strep Sent.                                                                             ld1 

17:41 Strep Sent.                                                                             ld1 

17:41 Flu Sent.                                                                               ld1 

17:41 COVID-19 SARS RT PCR (Document "Date of Onset" if Symptomatic) Sent.                    ld1 

19:15 Mona Mancia, RN is Primary Nurse.                                                   ld1 

19:16 Patient has correct armband on for positive identification. Placed in gown. Bed in low  ld1 

      position. Call light in reach. Side rails up X2. Cardiac monitor on. Pulse ox on. NIBP      

      on. Door closed. Noise minimized. Warm blanket given.                                       

19:16 No provider procedures requiring assistance completed.                                  ld1 

20:10 Chest Pa And Lat (2 Views) XRAY In Process Unspecified.                                 EDMS

20:51 Patient did not have IV access during this emergency room visit.                        fu  

                                                                                                  

Administered Medications:                                                                         

17:41 Drug: Tussionex Pennkinetic ER (chlorpheniramine-hydrocodone) Suspension 5 ml Route: PO;ld1 

20:44 Follow up: Response: Pain is decreased                                                  fu  

20:44 Drug: Decadron (dexamethasone) 10 mg Route: IM; Site: right deltoid;                    fu  

                                                                                                  

                                                                                                  

Medication:                                                                                       

19:16 VIS not applicable for this client.                                                     ld1 

                                                                                                  

Outcome:                                                                                          

20:34 Discharge ordered by MD.                                                                pm1 

20:45 Discharged to home ambulatory, with family.                                             fu  

20:45 Condition: stable                                                                           

20:45 Discharge instructions given to patient, Instructed on discharge instructions, follow       

      up and referral plans. Demonstrated understanding of instructions, follow-up care,          

      Prescriptions given X 3.                                                                    

21:03 Patient left the ED.                                                                    fu  

                                                                                                  

Signatures:                                                                                       

Dispatcher [employer]                           EDMS                                                 

Ramos Alvarado NP                    NP   pm1                                                  

Sreekanth Jason, RN                      RN   Mona Rai RN                     RN   ld1                                                  

Lela Pickens                                                  

                                                                                                  

**************************************************************************************************